# Patient Record
Sex: FEMALE | Race: WHITE | NOT HISPANIC OR LATINO | Employment: OTHER | URBAN - METROPOLITAN AREA
[De-identification: names, ages, dates, MRNs, and addresses within clinical notes are randomized per-mention and may not be internally consistent; named-entity substitution may affect disease eponyms.]

---

## 2023-12-06 ENCOUNTER — OFFICE VISIT (OUTPATIENT)
Dept: DERMATOLOGY | Facility: CLINIC | Age: 68
End: 2023-12-06
Payer: MEDICARE

## 2023-12-06 DIAGNOSIS — D22.9 MELANOCYTIC NEVUS, UNSPECIFIED LOCATION: Primary | ICD-10-CM

## 2023-12-06 DIAGNOSIS — L82.1 SEBORRHEIC KERATOSIS: ICD-10-CM

## 2023-12-06 DIAGNOSIS — L81.4 LENTIGO: ICD-10-CM

## 2023-12-06 DIAGNOSIS — L90.5 SCAR CONDITIONS AND FIBROSIS OF SKIN: ICD-10-CM

## 2023-12-06 DIAGNOSIS — D18.01 HEMANGIOMA OF SKIN: ICD-10-CM

## 2023-12-06 DIAGNOSIS — L57.0 ACTINIC KERATOSIS: ICD-10-CM

## 2023-12-06 DIAGNOSIS — Z85.828 PERSONAL HISTORY OF SKIN CANCER: ICD-10-CM

## 2023-12-06 DIAGNOSIS — L56.5 DSAP (DISSEMINATED SUPERFICIAL ACTINIC POROKERATOSIS): ICD-10-CM

## 2023-12-06 PROCEDURE — 17000 DESTRUCT PREMALG LESION: CPT | Performed by: DERMATOLOGY

## 2023-12-06 PROCEDURE — 1159F MED LIST DOCD IN RCRD: CPT | Performed by: DERMATOLOGY

## 2023-12-06 PROCEDURE — 99213 OFFICE O/P EST LOW 20 MIN: CPT | Performed by: DERMATOLOGY

## 2023-12-06 PROCEDURE — 17003 DESTRUCT PREMALG LES 2-14: CPT | Performed by: DERMATOLOGY

## 2023-12-06 PROCEDURE — 1036F TOBACCO NON-USER: CPT | Performed by: DERMATOLOGY

## 2023-12-06 RX ORDER — ROSUVASTATIN CALCIUM 5 MG/1
TABLET, COATED ORAL
COMMUNITY

## 2023-12-06 RX ORDER — LIFITEGRAST 50 MG/ML
SOLUTION/ DROPS OPHTHALMIC
COMMUNITY
Start: 2022-12-05

## 2023-12-06 RX ORDER — ACETAMINOPHEN 500 MG
TABLET ORAL
COMMUNITY
Start: 2022-11-01

## 2023-12-06 ASSESSMENT — ENCOUNTER SYMPTOMS
DEPRESSION: 0
LOSS OF SENSATION IN FEET: 0
OCCASIONAL FEELINGS OF UNSTEADINESS: 0

## 2023-12-06 NOTE — PROGRESS NOTES
Subjective   Terri Ley is a 68 y.o. female who presents for the following: Skin Check.    Skin Cancer Screening  She has a history of heavy sun exposure. She is in the sun frequently. She uses sunscreen daily. She reports no skin symptoms. Her moles are not changing.    Spots that concern her: none      History of Skin Cancer  History of Squamous Cell Carcinoma - 3/30/2023; excision performed by Dr. Sena Rhodes on 4/21/2023      Objective   Well appearing patient in no apparent distress; mood and affect are within normal limits.    A full examination was performed including scalp, head, eyes, ears, nose, lips, neck, chest, axillae, abdomen, back, buttocks, bilateral upper extremities, bilateral lower extremities, hands, feet, fingers, toes, fingernails, and toenails. All findings within normal limits unless otherwise noted below.    All nevi were symmetric brown macules without atypia on dermoscopy.     Scattered tan macules in sun-exposed areas.    Stuck on verrucous, tan-brown papules and plaques.      Scattered cherry-red papule(s).    Scar is well-healed without evidence of recurrence    RAYNA Legs, RAYNA Arms (10)  Destruction of Actinic Keratosis Procedure Note  Diagnosis: AK  Location:  RAYNA legs and RAYNA arms  Informed consent: Discussed risks (permanent scarring, light or dark discoloration, infection, pain, bleeding, bruising, redness, blister formation, and recurrence of the lesion) and benefits of the procedure, as well as the alternatives.  Informed consent was obtained.  Anesthesia: not required  Procedure Details:  The lesion was destroyed with liquid nitrogen. The patient tolerated procedure well.  Total number of lesions treated:  10  Plan:  The patient was instructed on post-op care. Recommend OTC analgesia as needed for pain.        Assessment/Plan   Melanocytic nevus, unspecified location    The ABCDEs of melanoma and warning signs of non-melanoma skin cancer were discussed with patient and  patient expressed understanding. Sun protection and use of at least SPF 30 discussed with patient. Pt instructed to reapply every 2 hours.     Lentigo    The ABCDEs of melanoma and warning signs of non-melanoma skin cancer were discussed with patient and patient expressed understanding. Sun protection and use of at least SPF 30 discussed with patient. Pt instructed to reapply every 2 hours.     Seborrheic keratosis    Hemangioma of skin    Scar conditions and fibrosis of skin    Actinic keratosis (10)  RAYNA Legs, RAYNA Arms    Destr of lesion - RAYNA Legs, RAYNA Arms  Complexity: simple    Destruction method: cryotherapy    Informed consent: discussed and consent obtained    Lesion destroyed using liquid nitrogen: Yes    Cryotherapy cycles:  1  Outcome: patient tolerated procedure well with no complications    Post-procedure details: wound care instructions given        Follow up in 6-12 months.    Scribe Attestation  By signing my name below, IMonisha Scribe   attest that this documentation has been prepared under the direction and in the presence of Dasia Casey MD.

## 2024-01-02 ENCOUNTER — OFFICE VISIT (OUTPATIENT)
Dept: ORTHOPEDIC SURGERY | Facility: CLINIC | Age: 69
End: 2024-01-02
Payer: MEDICARE

## 2024-01-02 ENCOUNTER — ANCILLARY PROCEDURE (OUTPATIENT)
Dept: RADIOLOGY | Facility: CLINIC | Age: 69
End: 2024-01-02
Payer: MEDICARE

## 2024-01-02 DIAGNOSIS — Z96.641 AFTERCARE FOLLOWING RIGHT HIP JOINT REPLACEMENT SURGERY: ICD-10-CM

## 2024-01-02 DIAGNOSIS — Z47.1 AFTERCARE FOLLOWING RIGHT HIP JOINT REPLACEMENT SURGERY: Primary | ICD-10-CM

## 2024-01-02 DIAGNOSIS — Z47.1 AFTERCARE FOLLOWING RIGHT HIP JOINT REPLACEMENT SURGERY: ICD-10-CM

## 2024-01-02 DIAGNOSIS — Z96.641 AFTERCARE FOLLOWING RIGHT HIP JOINT REPLACEMENT SURGERY: Primary | ICD-10-CM

## 2024-01-02 PROCEDURE — 99213 OFFICE O/P EST LOW 20 MIN: CPT | Performed by: ORTHOPAEDIC SURGERY

## 2024-01-02 PROCEDURE — 73502 X-RAY EXAM HIP UNI 2-3 VIEWS: CPT | Mod: RIGHT SIDE | Performed by: ORTHOPAEDIC SURGERY

## 2024-01-02 PROCEDURE — 73502 X-RAY EXAM HIP UNI 2-3 VIEWS: CPT | Mod: RT

## 2024-01-02 PROCEDURE — 1036F TOBACCO NON-USER: CPT | Performed by: ORTHOPAEDIC SURGERY

## 2024-01-02 PROCEDURE — 1159F MED LIST DOCD IN RCRD: CPT | Performed by: ORTHOPAEDIC SURGERY

## 2024-01-02 NOTE — PROGRESS NOTES
History of present illness    68-year-old female here for follow-up 1 year from her right total hip replacement she states the hip is doing very well she is back to playing tennis she has been doing some strength training and that seems to be helping she is having some soreness in her left knee she has a meniscal tear and some arthritis in her left knee she has been trying to treat conservatively but as far as her hip goes everything is feeling good      Past medical , Surgical, Family and social history reviewed.      Physical exam  General: No acute distress and breathing comfortably.  Patient is pleasant and cooperative with the examination.    Extremity  Good range of motion of the right hip no pain with internal ex rotation neurologically intact distally good range of motion brisk cap refill compartments soft calves nontender    Diagnostics      XR hip right with pelvis when performed 2 or 3 views    Result Date: 1/2/2024  Interpreted By:  Lakesha Kim, STUDY: XR HIP RIGHT WITH PELVIS WHEN PERFORMED 2 OR 3 VIEWS;  ;  1/2/2024 11:50 am   INDICATION: Signs/Symptoms:s/p THR.   ACCESSION NUMBER(S): [id number]   ORDERING CLINICIAN: LAKESHA KIM   FINDINGS: Two views show patient status post total hip replacement in good alignment.  No signs of fracture dislocation or other bony abnormality       Signed by: Lakesha Kim 1/2/2024 11:51 AM Dictation workstation:   [id number]       Procedure  [ none]    Assessment  Status post total hip replacement right    Treatment plan  1.  Continue with activities tolerated the hip gives her more trouble she let me know otherwise follow-up as needed.  2. [   ]  3. [   ]  4.  All of the patient's questions were answered.    This note was prepared using voice recognition software.  The details of this note are correct and have been reviewed, and corrected to the best of my ability.  Some grammatical areas may persist related to the Dragon  software    Emiliano Kim MD  Senior Attending Physician  [facility]  [facility] [place]    [phone]

## 2024-05-07 ENCOUNTER — HOSPITAL ENCOUNTER (OUTPATIENT)
Dept: RADIOLOGY | Facility: EXTERNAL LOCATION | Age: 69
Discharge: HOME | End: 2024-05-07
Payer: MEDICARE

## 2024-05-07 DIAGNOSIS — R05.9 COUGH, UNSPECIFIED TYPE: ICD-10-CM

## 2024-05-28 DIAGNOSIS — Z47.1 AFTERCARE FOLLOWING RIGHT HIP JOINT REPLACEMENT SURGERY: Primary | ICD-10-CM

## 2024-05-28 DIAGNOSIS — Z96.641 AFTERCARE FOLLOWING RIGHT HIP JOINT REPLACEMENT SURGERY: Primary | ICD-10-CM

## 2024-05-28 RX ORDER — AMOXICILLIN 500 MG/1
CAPSULE ORAL
Qty: 4 CAPSULE | Refills: 5 | Status: SHIPPED | OUTPATIENT
Start: 2024-05-28

## 2024-06-17 ENCOUNTER — OFFICE VISIT (OUTPATIENT)
Dept: DERMATOLOGY | Facility: CLINIC | Age: 69
End: 2024-06-17
Payer: MEDICARE

## 2024-06-17 DIAGNOSIS — L82.1 SEBORRHEIC KERATOSIS: Primary | ICD-10-CM

## 2024-06-17 DIAGNOSIS — L73.9 FOLLICULITIS: ICD-10-CM

## 2024-06-17 PROCEDURE — 1159F MED LIST DOCD IN RCRD: CPT | Performed by: DERMATOLOGY

## 2024-06-17 PROCEDURE — 99213 OFFICE O/P EST LOW 20 MIN: CPT | Performed by: DERMATOLOGY

## 2024-06-17 NOTE — PROGRESS NOTES
Subjective   Terri Ley is a 68 y.o. female who presents for the following: Suspicious Skin Lesion (Left upper arm).    Skin Lesion  She describes it as a growth, that is located on her  L shoulder .  It was first noticed 4 weeks ago. It has been causing no skin symptoms and is growing. Previously this spot has been frozen with liquid nitrogen.  Other spots that are concerning: spots on chest/back      Objective   Well appearing patient in no apparent distress; mood and affect are within normal limits.    A focused examination was performed including chest, back and L arm. All findings within normal limits unless otherwise noted below.    Left Shoulder - Anterior  Stuck on verrucous, tan-brown papules and plaques.      Chest - Medial (Center), Mid Back  Follicularly-based erythematous papules and pustules.      Assessment/Plan   Seborrheic keratosis  Left Shoulder - Anterior    The benign nature of the diagnosis was explained to patient. LN2 x 2 cycles performed to ISK on L shoulder per pt request. Wound care d/w pt and pt expressed understanding.       Folliculitis  Chest - Medial (Center); Mid Back    The nature of the diagnosis was explained to patient. Encouraged patient to treat with cortisone BID x 1 week as no evidence of infection, just itching. Pt agrees with plan as above.       Follow up for regularly scheduled FBSE.

## 2024-08-01 ENCOUNTER — OFFICE VISIT (OUTPATIENT)
Dept: DERMATOLOGY | Facility: CLINIC | Age: 69
End: 2024-08-01

## 2024-08-01 DIAGNOSIS — Z41.9 SURGERY, ELECTIVE: Primary | ICD-10-CM

## 2024-08-01 PROBLEM — C44.92 SQUAMOUS CELL SKIN CANCER: Status: ACTIVE | Noted: 2023-03-30

## 2024-08-01 PROCEDURE — PBTXA ADMINISTRATION FEE COSMETIC: Performed by: DERMATOLOGY

## 2024-08-01 PROCEDURE — 1036F TOBACCO NON-USER: CPT | Performed by: DERMATOLOGY

## 2024-08-01 PROCEDURE — 1159F MED LIST DOCD IN RCRD: CPT | Performed by: DERMATOLOGY

## 2024-08-01 PROCEDURE — BOTOX BOTOX 1 UNIT: Performed by: DERMATOLOGY

## 2024-08-01 NOTE — PROGRESS NOTES
Subjective     Terri Ley is a 68 y.o. female who presents for the following: Cosmetic (Patient is here today for botox. On her last visit of 01/30/2023, patient had 26 units of botox done. She did like the results. She would like to see what is recommended for units).         Review of Systems:  No other skin or systemic complaints other than what is documented elsewhere in the note.    The following portions of the chart were reviewed this encounter and updated as appropriate:           Specialty Problems          Dermatology Problems    Squamous cell skin cancer     In situ, left thigh, excised by Dr Rhodes 04/11/2023          Past Medical History:  Terri Ley  has a past medical history of Squamous cell skin cancer (03/30/2023).    Past Surgical History:  Terri Ley  has no past surgical history on file.    Family History:  Patient family history is not on file.       Objective   Well appearing patient in no apparent distress; mood and affect are within normal limits.    A focused skin examination was performed. All findings within normal limits unless otherwise noted below.    Assessment/Plan   1. Surgery, elective (4)  Glabella, Left Forehead, Mid Frontal Scalp, Right Forehead  Dynamic and static rhytids          Botox is a neurotoxin which prevents muscles from marbella that can help soften and smooth fine lines/wrinkles.  Risks and benefits were discussed bruising and swelling, lid droop, dropping of eyebrow, asymmetrical smile. For continued improvement ongoing treatment with Botox will soften lines  Botox typically begins to work 3 days after injection, full effect by 14 days. Typically lasts about 3 months. Continued treatments will continue to help lines to soften over time. However, lines may not completely disappear.  After treatment do not lie flat for 4 hours and do not exercise for 24 hours after the procedure.     Chemodenervation - Glabella, Left Forehead, Mid Frontal  Scalp, Right Forehead    Date/Time: 8/1/2024 10:16 AM    Performed by: Dasia Casey MD  Authorized by: Dasia Casey MD      Cosmetic:  yesnot medical botulinum toxin injection    Consent:      Consent obtained:  Written     Consent given by:  Patient     Risks discussed:  Poor cosmetic result     Alternatives discussed:  No treatment    Universal protocol:      Relevant documents present and verified:  Yes       Site/side verified:  Yes       Immediately prior to procedure a time out was called:  Yes       Patient identity confirmed:  Verbally with patient    Pre-procedure details:   Prep type:  Isopropyl alcohol    Procedure details:      Diluted by:  Preservative free saline     Toxin (Brand):  OnaBoNT-A (Botox)    Total units injected:  15    Post-procedure details:      Patient tolerance of procedure:  Tolerated well, no immediate complications    Related Medications  botulinum toxin Type A (Cosm) (Botox) injection 15 Units      Follow up 3-6 months per pt

## 2024-08-20 ENCOUNTER — APPOINTMENT (OUTPATIENT)
Dept: PRIMARY CARE | Facility: CLINIC | Age: 69
End: 2024-08-20
Payer: MEDICARE

## 2024-09-23 ENCOUNTER — APPOINTMENT (OUTPATIENT)
Dept: DERMATOLOGY | Facility: CLINIC | Age: 69
End: 2024-09-23
Payer: MEDICARE

## 2024-09-23 DIAGNOSIS — D48.5 NEOPLASM OF UNCERTAIN BEHAVIOR OF SKIN: ICD-10-CM

## 2024-09-23 PROCEDURE — 1159F MED LIST DOCD IN RCRD: CPT | Performed by: DERMATOLOGY

## 2024-09-23 PROCEDURE — 1036F TOBACCO NON-USER: CPT | Performed by: DERMATOLOGY

## 2024-09-23 PROCEDURE — 99213 OFFICE O/P EST LOW 20 MIN: CPT | Performed by: DERMATOLOGY

## 2024-09-23 PROCEDURE — 11102 TANGNTL BX SKIN SINGLE LES: CPT | Performed by: DERMATOLOGY

## 2024-09-23 ASSESSMENT — DERMATOLOGY QUALITY OF LIFE (QOL) ASSESSMENT
ARE THERE EXCLUSIONS OR EXCEPTIONS FOR THE QUALITY OF LIFE ASSESSMENT: NO
RATE HOW BOTHERED YOU ARE BY SYMPTOMS OF YOUR SKIN PROBLEM (EG, ITCHING, STINGING BURNING, HURTING OR SKIN IRRITATION): 0 - NEVER BOTHERED
RATE HOW BOTHERED YOU ARE BY EFFECTS OF YOUR SKIN PROBLEMS ON YOUR ACTIVITIES (EG, GOING OUT, ACCOMPLISHING WHAT YOU WANT, WORK ACTIVITIES OR YOUR RELATIONSHIPS WITH OTHERS): 0 - NEVER BOTHERED
WHAT SINGLE SKIN CONDITION LISTED BELOW IS THE PATIENT ANSWERING THE QUALITY-OF-LIFE ASSESSMENT QUESTIONS ABOUT: NONE OF THE ABOVE
DATE THE QUALITY-OF-LIFE ASSESSMENT WAS COMPLETED: 67106
RATE HOW EMOTIONALLY BOTHERED YOU ARE BY YOUR SKIN PROBLEM (FOR EXAMPLE, WORRY, EMBARRASSMENT, FRUSTRATION): 0 - NEVER BOTHERED

## 2024-09-23 ASSESSMENT — DERMATOLOGY PATIENT ASSESSMENT
WHERE ARE THESE NEW OR CHANGING LESIONS LOCATED: RIGHT CHEEK
HAVE YOU HAD OR DO YOU HAVE VASCULAR DISEASE: NO
HAVE YOU HAD OR DO YOU HAVE A STAPH INFECTION: NO
DO YOU USE A TANNING BED: NO
DO YOU HAVE ANY NEW OR CHANGING LESIONS: YES
ARE YOU AN ORGAN TRANSPLANT RECIPIENT: NO
DO YOU USE SUNSCREEN: OCCASIONALLY

## 2024-09-23 ASSESSMENT — ITCH NUMERIC RATING SCALE: HOW SEVERE IS YOUR ITCHING?: 0

## 2024-09-23 ASSESSMENT — PATIENT GLOBAL ASSESSMENT (PGA): PATIENT GLOBAL ASSESSMENT: PATIENT GLOBAL ASSESSMENT:  1 - CLEAR

## 2024-09-23 NOTE — PROGRESS NOTES
Subjective     Terri Ley is a 69 y.o. female who presents for the following: Suspicious Skin Lesion.     Skin Lesion  She describes it as a spot, that is located on her  right cheek .  It was first noticed several months ago. It has been causing no skin symptoms and is growing. Previously this spot has not been treated before.      Review of Systems:  No other skin or systemic complaints other than what is documented elsewhere in the note.    The following portions of the chart were reviewed this encounter and updated as appropriate:       Skin Cancer History  No skin cancer on file.    Specialty Problems          Dermatology Problems    Squamous cell skin cancer     In situ, left thigh, excised by Dr Rhodes 04/11/2023          Past Medical History:  Terri Ley  has a past medical history of Squamous cell skin cancer (03/30/2023).    Past Surgical History:  Terri Ley  has no past surgical history on file.    Family History:  Patient family history is not on file.       Objective   Well appearing patient in no apparent distress; mood and affect are within normal limits.    A focused skin examination was performed. All findings within normal limits unless otherwise noted below.    Assessment/Plan   1. Neoplasm of uncertain behavior of skin  Right Malar Cheek  Erythematous scaly papule.    Lesion biopsy  Type of biopsy: tangential    Informed consent: discussed and consent obtained    Timeout: patient name, date of birth, surgical site, and procedure verified    Procedure prep:  Patient was prepped and draped  Anesthesia: the lesion was anesthetized in a standard fashion    Anesthetic:  1% lidocaine w/ epinephrine 1-100,000 local infiltration  Instrument used: DermaBlade    Hemostasis achieved with: aluminum chloride    Outcome: patient tolerated procedure well    Post-procedure details: sterile dressing applied and wound care instructions given    Dressing type: petrolatum and bandage         Follow up pending biopsy results.

## 2024-09-25 LAB
LABORATORY COMMENT REPORT: NORMAL
PATH REPORT.FINAL DX SPEC: NORMAL
PATH REPORT.GROSS SPEC: NORMAL
PATH REPORT.MICROSCOPIC SPEC OTHER STN: NORMAL
PATH REPORT.RELEVANT HX SPEC: NORMAL
PATH REPORT.TOTAL CANCER: NORMAL

## 2024-09-26 DIAGNOSIS — C44.320 SQUAMOUS CELL CARCINOMA OF SKIN OF FACE: ICD-10-CM

## 2024-09-26 DIAGNOSIS — C44.319 BASAL CELL CARCINOMA (BCC) OF RIGHT CHEEK: Primary | ICD-10-CM

## 2024-09-26 NOTE — RESULT ENCOUNTER NOTE
Although patient had already seen her results, patient was called and informed of shave biopsy results of the right malar cheek:  BCC.  Per Dr. Casey, Mohs is required.  PA will be started today.

## 2024-10-28 ENCOUNTER — APPOINTMENT (OUTPATIENT)
Dept: CARDIOLOGY | Facility: CLINIC | Age: 69
End: 2024-10-28
Payer: MEDICARE

## 2024-10-28 VITALS
SYSTOLIC BLOOD PRESSURE: 126 MMHG | WEIGHT: 136 LBS | DIASTOLIC BLOOD PRESSURE: 72 MMHG | HEART RATE: 121 BPM | BODY MASS INDEX: 25.03 KG/M2 | HEIGHT: 62 IN

## 2024-10-28 DIAGNOSIS — Z01.810 PREOP CARDIOVASCULAR EXAM: ICD-10-CM

## 2024-10-28 DIAGNOSIS — Z71.89 ENCOUNTER FOR MEDICATION REVIEW AND COUNSELING: ICD-10-CM

## 2024-10-28 DIAGNOSIS — Z71.89 ENCOUNTER TO DISCUSS TREATMENT OPTIONS: ICD-10-CM

## 2024-10-28 DIAGNOSIS — Z76.89 ENCOUNTER TO ESTABLISH CARE WITH NEW DOCTOR: ICD-10-CM

## 2024-10-28 DIAGNOSIS — I48.3 TYPICAL ATRIAL FLUTTER (MULTI): ICD-10-CM

## 2024-10-28 DIAGNOSIS — I48.91 ATRIAL FIBRILLATION, UNSPECIFIED TYPE (MULTI): Primary | ICD-10-CM

## 2024-10-28 DIAGNOSIS — I48.92 ATRIAL FLUTTER, UNSPECIFIED TYPE (MULTI): ICD-10-CM

## 2024-10-28 DIAGNOSIS — Z78.9 NEVER SMOKED CIGARETTES: ICD-10-CM

## 2024-10-28 PROBLEM — I48.20 CHRONIC ATRIAL FIBRILLATION (MULTI): Status: RESOLVED | Noted: 2024-10-28 | Resolved: 2024-10-28

## 2024-10-28 PROBLEM — I48.20 CHRONIC ATRIAL FIBRILLATION (MULTI): Status: ACTIVE | Noted: 2024-10-28

## 2024-10-28 PROBLEM — I48.0 PAROXYSMAL ATRIAL FIBRILLATION (MULTI): Status: ACTIVE | Noted: 2024-10-28

## 2024-10-28 PROCEDURE — 93000 ELECTROCARDIOGRAM COMPLETE: CPT | Performed by: INTERNAL MEDICINE

## 2024-10-28 PROCEDURE — 3008F BODY MASS INDEX DOCD: CPT | Performed by: INTERNAL MEDICINE

## 2024-10-28 PROCEDURE — 1036F TOBACCO NON-USER: CPT | Performed by: INTERNAL MEDICINE

## 2024-10-28 PROCEDURE — 99205 OFFICE O/P NEW HI 60 MIN: CPT | Performed by: INTERNAL MEDICINE

## 2024-10-28 PROCEDURE — 1159F MED LIST DOCD IN RCRD: CPT | Performed by: INTERNAL MEDICINE

## 2024-10-28 RX ORDER — METOPROLOL TARTRATE 25 MG/1
TABLET, FILM COATED ORAL
Qty: 30 TABLET | Refills: 2 | Status: SHIPPED | OUTPATIENT
Start: 2024-10-28

## 2024-10-28 RX ORDER — ENOXAPARIN SODIUM 100 MG/ML
60 INJECTION SUBCUTANEOUS EVERY 12 HOURS
Qty: 3 EACH | Refills: 1 | Status: SHIPPED | OUTPATIENT
Start: 2024-10-28

## 2024-10-28 RX ORDER — METOPROLOL SUCCINATE 50 MG/1
50 TABLET, EXTENDED RELEASE ORAL
COMMUNITY
Start: 2024-10-14

## 2024-10-28 RX ORDER — MUPIROCIN 20 MG/G
1 OINTMENT TOPICAL ONCE
OUTPATIENT
Start: 2024-10-28 | End: 2024-10-28

## 2024-10-28 RX ORDER — CHLORHEXIDINE GLUCONATE 40 MG/ML
SOLUTION TOPICAL ONCE
OUTPATIENT
Start: 2024-10-28 | End: 2024-10-28

## 2024-10-28 ASSESSMENT — ENCOUNTER SYMPTOMS
PALPITATIONS: 0
DYSPNEA ON EXERTION: 0

## 2024-10-29 ENCOUNTER — TELEPHONE (OUTPATIENT)
Dept: CARDIOLOGY | Facility: CLINIC | Age: 69
End: 2024-10-29
Payer: MEDICARE

## 2024-11-05 ENCOUNTER — TELEPHONE (OUTPATIENT)
Dept: NEPHROLOGY | Facility: CLINIC | Age: 69
End: 2024-11-05

## 2024-11-05 ENCOUNTER — APPOINTMENT (OUTPATIENT)
Dept: DERMATOLOGY | Facility: CLINIC | Age: 69
End: 2024-11-05
Payer: MEDICARE

## 2024-11-05 NOTE — PROGRESS NOTES
Pt left vm wanting to clarify if she is allowed to do strength training and lift 10lb weights until she has her ablation.

## 2024-11-06 NOTE — PROGRESS NOTES
Returned call to patient. Per Dr. Perez, patient is OK to lift 10lb weights up until ablation. Patient verbalized understanding.

## 2024-11-19 DIAGNOSIS — I48.91 ATRIAL FIBRILLATION, UNSPECIFIED TYPE (MULTI): Primary | ICD-10-CM

## 2024-11-19 DIAGNOSIS — I48.92 ATRIAL FLUTTER, UNSPECIFIED TYPE (MULTI): ICD-10-CM

## 2024-11-20 ENCOUNTER — APPOINTMENT (OUTPATIENT)
Dept: DERMATOLOGY | Facility: CLINIC | Age: 69
End: 2024-11-20
Payer: MEDICARE

## 2024-11-22 RX ORDER — METOPROLOL TARTRATE 25 MG/1
TABLET, FILM COATED ORAL
Qty: 30 TABLET | Refills: 1 | Status: SHIPPED | OUTPATIENT
Start: 2024-11-22

## 2024-12-03 ENCOUNTER — APPOINTMENT (OUTPATIENT)
Dept: DERMATOLOGY | Facility: CLINIC | Age: 69
End: 2024-12-03
Payer: MEDICARE

## 2024-12-03 VITALS — DIASTOLIC BLOOD PRESSURE: 81 MMHG | SYSTOLIC BLOOD PRESSURE: 142 MMHG | HEART RATE: 94 BPM

## 2024-12-03 DIAGNOSIS — C44.310 BASAL CELL CARCINOMA (BCC) OF SKIN OF FACE, UNSPECIFIED PART OF FACE: ICD-10-CM

## 2024-12-03 NOTE — PROGRESS NOTES
Office Visit Note  Date: 12/3/2024  Surgeon:  Sean Rhodes MD PhD  Office Location: [address] [address]  Dept: [phone]  Dept Fax: [phone]  Referring Provider: Dasia Casey MD  [address] [place] [address]  [address], [address],  [facility]    Subjective   Terri Ley is a 69 y.o. female who presents for the following: MOHS Surgery (Right Malar Cheek)    According to the patient, the lesion has been present for approximately greater than 1 year at the time of diagnosis.  The lesion is not causing symptoms.  The lesion has not been treated previously.    The patient does not have a pacemaker / defibrillator.  The patient does not have a heart valve / joint replacement.    The patient is on blood thinners.  The patient does not have a history of hepatitis B or C.  The patient does not have a history of HIV.  The patient does not have a history of immunosuppression (e.g. organ transplantation, malignancy, medications)    Review of Systems:  No other skin or systemic complaints other than what is documented elsewhere in the note.    MEDICAL HISTORY: clinically relevant history including significant past medical history, medications and allergies was reviewed and documented in Epic.    Objective   Well appearing patient in no apparent distress; mood and affect are within normal limits.  Vital signs: See record.  Noted on the Right Malar Cheek  Is a 0.2 x 0.2 cm scar              The patient confirmed the identified site.    Discussion:  The nature of the diagnosis was explained. The lesion is a skin cancer.  It has a risk of local growth and distant spread. The condition is associated with sun exposure.  Warning signs of non-melanoma skin cancer discussed. Patient was instructed to perform monthly self skin examination.  We recommended that the patient have regular full skin exams given an increased risk of subsequent skin cancers. The  patient was instructed to use sun protective behaviors including use of broad spectrum sunscreens and sun protective clothing to reduce risk of skin cancers.      Risks, benefits, side effects of Mohs surgery were discussed with patient and the patient voiced understanding.  It was explained that even though the cure rate of Mohs is very high it is not 100%. Risks of surgery including but not limited to bleeding, infection, numbness, nerve damage, and scar were reviewed.  Discussion included wound care requirements, activity restrictions, likely scar outcome and time to heal.     After Mohs surgery, the defect may need to be repaired surgically and the scar may be longer than the original lesion.  Reconstruction options, risks, and benefits were reviewed including second intention healing, linear repair (4-1 ratio was explained), local flaps, skin grafts, cartilage grafts and interpolation flaps (the need for multiple surgeries was explained). Possible outcomes were reviewed including likely scar appearance, failure of flap survival, infection, bleeding and the need for revision surgery.     The pathology was reviewed, the photograph was reviewed, and the referring physician's note was reviewed.    Patient elected for Mohs surgery.

## 2024-12-03 NOTE — PROGRESS NOTES
Mohs Surgery Operative Note    Date of Surgery:  12/3/2024  Surgeon:  Sean Rhodes MD PhD  Office Location: [address] [address]  Dept: [phone]  Dept Fax: [phone]  Referring Provider: Dasia Casey MD  [address] [place] [address]  [address], [address] [address]      Assessment/Plan   Pre-procedure:   Obtained informed consent: written from patient  The surgical site was identified and confirmed with the patient.     Intra-operative:   Audible time out called at : 1:50 PM 12/03/24  by: Brandon Schuler MA   Verified patient name, birthdate, site, specimen bottle label & requisition.    The planned procedure(s) was again reviewed with the patient. The risks of bleeding, infection, nerve damage and scarring were reviewed. Written authorization was obtained. The patient identity, surgical site, and planned procedure(s) were verified. The provider acted as both surgeon and pathologist.     Basal cell carcinoma (BCC) of skin of face, unspecified part of face  Right Malar Cheek    Mohs surgery    Consent obtained: written    Universal Protocol:  Procedure explained and questions answered to patient or proxy's satisfaction: Yes    Test results available and properly labeled: Yes    Pathology report reviewed: Yes    External notes reviewed: Yes    Photo or diagram used for site identification: Yes    Site/side marked: Yes    Slide independently reviewed by Mohs surgeon: Yes    Immediately prior to procedure a time out was called: Yes    Patient identity confirmed: verbally with patient  Preparation: Patient was prepped and draped in usual sterile fashion      Anticoagulation:  Is the patient taking prescription anticoagulant and/or aspirin prescribed/recommended by a physician? Yes    Was the anticoagulation regimen changed prior to Mohs? No      Anesthesia:  Anesthesia method: local infiltration  Local anesthetic: lidocaine 1% WITH epi    Procedure  Details:  Case ID Number: [id number]  Biopsy accession number: [id number]  Date of biopsy: 9/23/2024  Frozen section biopsy performed: No    Specimen debulked: No    Pre-Op diagnosis: basal cell carcinoma  Surgical site (from skin exam): Right Malar Cheek  Pre-operative length (cm): 0.2  Pre-operative width (cm): 0.2  Indications for Mohs surgery: anatomic location where tissue conservation is critical    Micrographic Surgery Details:  Post-operative length (cm): 0.4  Post-operative width (cm): 0.5  Number of Mohs stages: 1    Stage 1     Comments: The patient was brought into the operating room and placed in the procedure chair in the appropriate position.  The area positive by previous biopsy was identified and confirmed with the patient. The area of clinically obvious tumor was debulked using a curette and/or scalpel as needed. An incision was made following the Mohs approach through the skin. The specimen was taken to the lab, divided into 2 piece(s) and appropriately chromacoded and processed.                 Tumor features identified on Mohs section: no tumor identified     Tumor features identified on Mohs section comment: Clear in first cuts; nodular basal cell carcinoma appears in deeper sections    Depth of defect: subcutaneous fat    Patient tolerance of procedure: tolerated well, no immediate complications    Reconstruction:  Was the defect reconstructed? Yes    Was reconstruction performed by the same Mohs surgeon? Yes    Setting of reconstruction: outpatient office  When was reconstruction performed? same day  Type of reconstruction: linear  Linear reconstruction: intermediate  Length of linear repair (cm): 1.3  Subcutaneous Layers (Deep Stitches)   Suture size:  5-0  Suture type:  Vicryl  Stitches:  Buried vertical mattress  Fine/surface layer approximation (top stitches)   Epidermal/Superficial suture size:  5-0  Epidermal/Superficial suture type:  Fast-absorbing gut  Stitches: simple running     Hemostasis achieved with: electrodesiccation  Outcome: patient tolerated procedure well with no complications    Post-procedure details: sterile dressing applied and wound care instructions given    Dressing type: pressure dressing, petrolatum, Telfa pad and Hypafix          Intermediate Linear Repair:  Given the location and size of the defect, it was determined that an intermediate layered linear closure was required to restore normal anatomy and function. The repair is an intermediate closure as two layers of sutures were required. The defect was undermined extensively at the level of the subcutaneous plane. Standing cutaneous cones were removed using Burow's triangles. The wound edges were brought into close approximation with buried vertical mattress sutures. The remainder of the wound was then closed with epidermal top sutures.              The final repair measured 1.3 cm    Wound care was discussed, and the patient was given written post-operative wound care instructions.      The patient will follow up with Sean Rhodes MD PhD as needed for any post operative problems or concerns, and will follow up with their primary dermatologist as scheduled.

## 2024-12-11 ENCOUNTER — PATIENT MESSAGE (OUTPATIENT)
Dept: CARDIOLOGY | Facility: CLINIC | Age: 69
End: 2024-12-11

## 2024-12-11 ENCOUNTER — APPOINTMENT (OUTPATIENT)
Dept: DERMATOLOGY | Facility: CLINIC | Age: 69
End: 2024-12-11
Payer: MEDICARE

## 2024-12-12 ENCOUNTER — LAB (OUTPATIENT)
Dept: LAB | Facility: LAB | Age: 69
End: 2024-12-12
Payer: MEDICARE

## 2024-12-12 DIAGNOSIS — I48.92 ATRIAL FLUTTER, UNSPECIFIED TYPE (MULTI): ICD-10-CM

## 2024-12-12 LAB
ANION GAP SERPL CALC-SCNC: 14 MMOL/L (ref 10–20)
BUN SERPL-MCNC: 16 MG/DL (ref 6–23)
CALCIUM SERPL-MCNC: 9.3 MG/DL (ref 8.6–10.3)
CHLORIDE SERPL-SCNC: 106 MMOL/L (ref 98–107)
CO2 SERPL-SCNC: 25 MMOL/L (ref 21–32)
CREAT SERPL-MCNC: 0.67 MG/DL (ref 0.5–1.05)
EGFRCR SERPLBLD CKD-EPI 2021: >90 ML/MIN/1.73M*2
ERYTHROCYTE [DISTWIDTH] IN BLOOD BY AUTOMATED COUNT: 13 % (ref 11.5–14.5)
GLUCOSE SERPL-MCNC: 97 MG/DL (ref 74–99)
HCT VFR BLD AUTO: 48.6 % (ref 36–46)
HGB BLD-MCNC: 15.2 G/DL (ref 12–16)
INR PPP: 1.3 (ref 0.9–1.1)
MCH RBC QN AUTO: 29.3 PG (ref 26–34)
MCHC RBC AUTO-ENTMCNC: 31.3 G/DL (ref 32–36)
MCV RBC AUTO: 94 FL (ref 80–100)
NRBC BLD-RTO: 0 /100 WBCS (ref 0–0)
PLATELET # BLD AUTO: 195 X10*3/UL (ref 150–450)
POTASSIUM SERPL-SCNC: 4.3 MMOL/L (ref 3.5–5.3)
PROTHROMBIN TIME: 15.1 SECONDS (ref 9.8–12.8)
RBC # BLD AUTO: 5.19 X10*6/UL (ref 4–5.2)
SODIUM SERPL-SCNC: 141 MMOL/L (ref 136–145)
WBC # BLD AUTO: 6 X10*3/UL (ref 4.4–11.3)

## 2024-12-12 PROCEDURE — 85027 COMPLETE CBC AUTOMATED: CPT

## 2024-12-12 PROCEDURE — 85610 PROTHROMBIN TIME: CPT

## 2024-12-12 PROCEDURE — 36415 COLL VENOUS BLD VENIPUNCTURE: CPT

## 2024-12-12 PROCEDURE — 80048 BASIC METABOLIC PNL TOTAL CA: CPT

## 2024-12-16 RX ORDER — CHLORHEXIDINE GLUCONATE 40 MG/ML
SOLUTION TOPICAL ONCE
Status: CANCELLED | OUTPATIENT
Start: 2024-12-16 | End: 2024-12-16

## 2024-12-18 ENCOUNTER — HOSPITAL ENCOUNTER (OUTPATIENT)
Facility: HOSPITAL | Age: 69
Setting detail: OUTPATIENT SURGERY
Discharge: HOME | End: 2024-12-18
Attending: INTERNAL MEDICINE | Admitting: INTERNAL MEDICINE
Payer: MEDICARE

## 2024-12-18 ENCOUNTER — HOSPITAL ENCOUNTER (OUTPATIENT)
Dept: CARDIOLOGY | Facility: HOSPITAL | Age: 69
Discharge: HOME | End: 2024-12-18
Payer: MEDICARE

## 2024-12-18 ENCOUNTER — APPOINTMENT (OUTPATIENT)
Dept: CARDIOLOGY | Facility: HOSPITAL | Age: 69
End: 2024-12-18
Payer: MEDICARE

## 2024-12-18 VITALS
HEIGHT: 64 IN | RESPIRATION RATE: 16 BRPM | SYSTOLIC BLOOD PRESSURE: 141 MMHG | DIASTOLIC BLOOD PRESSURE: 84 MMHG | HEART RATE: 88 BPM | TEMPERATURE: 97.2 F | OXYGEN SATURATION: 98 % | BODY MASS INDEX: 23.37 KG/M2 | WEIGHT: 136.91 LBS

## 2024-12-18 VITALS
HEART RATE: 99 BPM | OXYGEN SATURATION: 98 % | DIASTOLIC BLOOD PRESSURE: 76 MMHG | SYSTOLIC BLOOD PRESSURE: 154 MMHG | RESPIRATION RATE: 14 BRPM

## 2024-12-18 DIAGNOSIS — I48.92 ATRIAL FLUTTER, UNSPECIFIED TYPE (MULTI): Primary | ICD-10-CM

## 2024-12-18 DIAGNOSIS — I48.91 ATRIAL FIBRILLATION, UNSPECIFIED TYPE (MULTI): ICD-10-CM

## 2024-12-18 DIAGNOSIS — I48.3 TYPICAL ATRIAL FLUTTER (MULTI): ICD-10-CM

## 2024-12-18 LAB
ANION GAP SERPL CALC-SCNC: 12 MMOL/L (ref 10–20)
APTT PPP: 34 SECONDS (ref 27–38)
ATRIAL RATE: 264 BPM
BUN SERPL-MCNC: 13 MG/DL (ref 6–23)
CALCIUM SERPL-MCNC: 9.1 MG/DL (ref 8.6–10.3)
CHLORIDE SERPL-SCNC: 108 MMOL/L (ref 98–107)
CO2 SERPL-SCNC: 24 MMOL/L (ref 21–32)
CREAT SERPL-MCNC: 0.63 MG/DL (ref 0.5–1.05)
EGFRCR SERPLBLD CKD-EPI 2021: >90 ML/MIN/1.73M*2
EJECTION FRACTION: 53 %
GLUCOSE SERPL-MCNC: 101 MG/DL (ref 74–99)
INR PPP: 1.1 (ref 0.9–1.1)
P AXIS: 270 DEGREES
POTASSIUM SERPL-SCNC: 4.3 MMOL/L (ref 3.5–5.3)
PROTHROMBIN TIME: 12.7 SECONDS (ref 9.8–12.8)
Q ONSET: 221 MS
QRS COUNT: 12 BEATS
QRS DURATION: 100 MS
QT INTERVAL: 412 MS
QTC CALCULATION(BAZETT): 447 MS
QTC FREDERICIA: 436 MS
R AXIS: 64 DEGREES
SODIUM SERPL-SCNC: 140 MMOL/L (ref 136–145)
T AXIS: 87 DEGREES
T OFFSET: 427 MS
VENTRICULAR RATE: 71 BPM

## 2024-12-18 PROCEDURE — 76937 US GUIDE VASCULAR ACCESS: CPT | Performed by: INTERNAL MEDICINE

## 2024-12-18 PROCEDURE — 2720000007 HC OR 272 NO HCPCS: Performed by: INTERNAL MEDICINE

## 2024-12-18 PROCEDURE — 7100000001 HC RECOVERY ROOM TIME - INITIAL BASE CHARGE

## 2024-12-18 PROCEDURE — 99153 MOD SED SAME PHYS/QHP EA: CPT | Performed by: INTERNAL MEDICINE

## 2024-12-18 PROCEDURE — C1732 CATH, EP, DIAG/ABL, 3D/VECT: HCPCS | Performed by: INTERNAL MEDICINE

## 2024-12-18 PROCEDURE — 93623 PRGRMD STIMJ&PACG IV RX NFS: CPT | Performed by: INTERNAL MEDICINE

## 2024-12-18 PROCEDURE — 92960 CARDIOVERSION ELECTRIC EXT: CPT | Mod: 59 | Performed by: INTERNAL MEDICINE

## 2024-12-18 PROCEDURE — 2500000004 HC RX 250 GENERAL PHARMACY W/ HCPCS (ALT 636 FOR OP/ED): Performed by: INTERNAL MEDICINE

## 2024-12-18 PROCEDURE — 93005 ELECTROCARDIOGRAM TRACING: CPT

## 2024-12-18 PROCEDURE — 93653 COMPRE EP EVAL TX SVT: CPT | Mod: 22 | Performed by: INTERNAL MEDICINE

## 2024-12-18 PROCEDURE — 7100000002 HC RECOVERY ROOM TIME - EACH INCREMENTAL 1 MINUTE

## 2024-12-18 PROCEDURE — 2780000003 HC OR 278 NO HCPCS: Performed by: INTERNAL MEDICINE

## 2024-12-18 PROCEDURE — G0269 OCCLUSIVE DEVICE IN VEIN ART: HCPCS | Mod: MUE | Performed by: INTERNAL MEDICINE

## 2024-12-18 PROCEDURE — 99152 MOD SED SAME PHYS/QHP 5/>YRS: CPT | Performed by: INTERNAL MEDICINE

## 2024-12-18 PROCEDURE — 99223 1ST HOSP IP/OBS HIGH 75: CPT | Performed by: NURSE PRACTITIONER

## 2024-12-18 PROCEDURE — 2500000005 HC RX 250 GENERAL PHARMACY W/O HCPCS: Performed by: NURSE PRACTITIONER

## 2024-12-18 PROCEDURE — 7100000009 HC PHASE TWO TIME - INITIAL BASE CHARGE

## 2024-12-18 PROCEDURE — C1730 CATH, EP, 19 OR FEW ELECT: HCPCS | Performed by: INTERNAL MEDICINE

## 2024-12-18 PROCEDURE — 2500000005 HC RX 250 GENERAL PHARMACY W/O HCPCS: Performed by: INTERNAL MEDICINE

## 2024-12-18 PROCEDURE — 7100000010 HC PHASE TWO TIME - EACH INCREMENTAL 1 MINUTE

## 2024-12-18 PROCEDURE — 7100000009 HC PHASE TWO TIME - INITIAL BASE CHARGE: Performed by: INTERNAL MEDICINE

## 2024-12-18 PROCEDURE — 80048 BASIC METABOLIC PNL TOTAL CA: CPT | Performed by: NURSE PRACTITIONER

## 2024-12-18 PROCEDURE — 7100000002 HC RECOVERY ROOM TIME - EACH INCREMENTAL 1 MINUTE: Performed by: INTERNAL MEDICINE

## 2024-12-18 PROCEDURE — 93653 COMPRE EP EVAL TX SVT: CPT | Performed by: INTERNAL MEDICINE

## 2024-12-18 PROCEDURE — C1760 CLOSURE DEV, VASC: HCPCS | Performed by: INTERNAL MEDICINE

## 2024-12-18 PROCEDURE — 2500000001 HC RX 250 WO HCPCS SELF ADMINISTERED DRUGS (ALT 637 FOR MEDICARE OP): Performed by: NURSE PRACTITIONER

## 2024-12-18 PROCEDURE — 36415 COLL VENOUS BLD VENIPUNCTURE: CPT | Performed by: NURSE PRACTITIONER

## 2024-12-18 PROCEDURE — 7100000010 HC PHASE TWO TIME - EACH INCREMENTAL 1 MINUTE: Performed by: INTERNAL MEDICINE

## 2024-12-18 PROCEDURE — 7100000001 HC RECOVERY ROOM TIME - INITIAL BASE CHARGE: Performed by: INTERNAL MEDICINE

## 2024-12-18 PROCEDURE — C1733 CATH, EP, OTHR THAN COOL-TIP: HCPCS | Performed by: INTERNAL MEDICINE

## 2024-12-18 PROCEDURE — 85730 THROMBOPLASTIN TIME PARTIAL: CPT | Performed by: NURSE PRACTITIONER

## 2024-12-18 PROCEDURE — 2500000004 HC RX 250 GENERAL PHARMACY W/ HCPCS (ALT 636 FOR OP/ED)

## 2024-12-18 PROCEDURE — 93005 ELECTROCARDIOGRAM TRACING: CPT | Mod: 59

## 2024-12-18 RX ORDER — CHLORHEXIDINE GLUCONATE 40 MG/ML
SOLUTION TOPICAL ONCE
Status: COMPLETED | OUTPATIENT
Start: 2024-12-18 | End: 2024-12-18

## 2024-12-18 RX ORDER — ASPIRIN 325 MG
325 TABLET ORAL DAILY
Status: DISCONTINUED | OUTPATIENT
Start: 2024-12-18 | End: 2024-12-18 | Stop reason: HOSPADM

## 2024-12-18 RX ORDER — ONDANSETRON 4 MG/1
4 TABLET, FILM COATED ORAL EVERY 8 HOURS PRN
Status: DISCONTINUED | OUTPATIENT
Start: 2024-12-18 | End: 2024-12-18 | Stop reason: HOSPADM

## 2024-12-18 RX ORDER — MIDAZOLAM HYDROCHLORIDE 1 MG/ML
INJECTION INTRAMUSCULAR; INTRAVENOUS AS NEEDED
Status: DISCONTINUED | OUTPATIENT
Start: 2024-12-18 | End: 2024-12-18 | Stop reason: HOSPADM

## 2024-12-18 RX ORDER — FENTANYL CITRATE 50 UG/ML
INJECTION, SOLUTION INTRAMUSCULAR; INTRAVENOUS AS NEEDED
Status: DISCONTINUED | OUTPATIENT
Start: 2024-12-18 | End: 2024-12-18 | Stop reason: HOSPADM

## 2024-12-18 RX ORDER — LIDOCAINE HYDROCHLORIDE 10 MG/ML
INJECTION, SOLUTION EPIDURAL; INFILTRATION; INTRACAUDAL; PERINEURAL AS NEEDED
Status: DISCONTINUED | OUTPATIENT
Start: 2024-12-18 | End: 2024-12-18 | Stop reason: HOSPADM

## 2024-12-18 RX ORDER — PROPOFOL 10 MG/ML
INJECTION, EMULSION INTRAVENOUS AS NEEDED
Status: DISCONTINUED | OUTPATIENT
Start: 2024-12-18 | End: 2024-12-18 | Stop reason: HOSPADM

## 2024-12-18 RX ORDER — ACETAMINOPHEN 325 MG/1
650 TABLET ORAL EVERY 4 HOURS PRN
Status: DISCONTINUED | OUTPATIENT
Start: 2024-12-18 | End: 2024-12-18 | Stop reason: HOSPADM

## 2024-12-18 RX ORDER — MIDAZOLAM HYDROCHLORIDE 1 MG/ML
INJECTION, SOLUTION INTRAMUSCULAR; INTRAVENOUS AS NEEDED
Status: DISCONTINUED | OUTPATIENT
Start: 2024-12-18 | End: 2024-12-18 | Stop reason: HOSPADM

## 2024-12-18 RX ORDER — ONDANSETRON HYDROCHLORIDE 2 MG/ML
INJECTION, SOLUTION INTRAVENOUS
Status: COMPLETED
Start: 2024-12-18 | End: 2024-12-18

## 2024-12-18 RX ORDER — ONDANSETRON HYDROCHLORIDE 2 MG/ML
4 INJECTION, SOLUTION INTRAVENOUS EVERY 8 HOURS PRN
Status: DISCONTINUED | OUTPATIENT
Start: 2024-12-18 | End: 2024-12-18 | Stop reason: HOSPADM

## 2024-12-18 RX ORDER — METOCLOPRAMIDE HYDROCHLORIDE 5 MG/ML
10 INJECTION INTRAMUSCULAR; INTRAVENOUS ONCE
Status: COMPLETED | OUTPATIENT
Start: 2024-12-18 | End: 2024-12-18

## 2024-12-18 RX ORDER — ONDANSETRON HYDROCHLORIDE 2 MG/ML
4 INJECTION, SOLUTION INTRAVENOUS ONCE
Status: COMPLETED | OUTPATIENT
Start: 2024-12-18 | End: 2024-12-18

## 2024-12-18 RX ORDER — IBUPROFEN 400 MG/1
400 TABLET ORAL EVERY 8 HOURS PRN
Status: DISCONTINUED | OUTPATIENT
Start: 2024-12-18 | End: 2024-12-18 | Stop reason: HOSPADM

## 2024-12-18 RX ORDER — LIDOCAINE HYDROCHLORIDE 20 MG/ML
JELLY TOPICAL AS NEEDED
Status: DISCONTINUED | OUTPATIENT
Start: 2024-12-18 | End: 2024-12-18 | Stop reason: HOSPADM

## 2024-12-18 RX ORDER — ASPIRIN 325 MG
325 TABLET ORAL DAILY
Start: 2024-12-18 | End: 2025-01-15

## 2024-12-18 ASSESSMENT — PAIN - FUNCTIONAL ASSESSMENT
PAIN_FUNCTIONAL_ASSESSMENT: 0-10

## 2024-12-18 ASSESSMENT — ENCOUNTER SYMPTOMS
SHORTNESS OF BREATH: 0
CONSTITUTIONAL NEGATIVE: 1
NEUROLOGICAL NEGATIVE: 1
RESPIRATORY NEGATIVE: 1
DIZZINESS: 0
LIGHT-HEADEDNESS: 0
ALLERGIC/IMMUNOLOGIC NEGATIVE: 1
CARDIOVASCULAR NEGATIVE: 1
PSYCHIATRIC NEGATIVE: 1
HEMATOLOGIC/LYMPHATIC NEGATIVE: 1
PALPITATIONS: 0
EYES NEGATIVE: 1
MUSCULOSKELETAL NEGATIVE: 1
GASTROINTESTINAL NEGATIVE: 1
WEAKNESS: 0
ENDOCRINE NEGATIVE: 1
CHEST TIGHTNESS: 0

## 2024-12-18 ASSESSMENT — COLUMBIA-SUICIDE SEVERITY RATING SCALE - C-SSRS
6. HAVE YOU EVER DONE ANYTHING, STARTED TO DO ANYTHING, OR PREPARED TO DO ANYTHING TO END YOUR LIFE?: NO
2. HAVE YOU ACTUALLY HAD ANY THOUGHTS OF KILLING YOURSELF?: NO
1. IN THE PAST MONTH, HAVE YOU WISHED YOU WERE DEAD OR WISHED YOU COULD GO TO SLEEP AND NOT WAKE UP?: NO

## 2024-12-18 ASSESSMENT — PAIN SCALES - GENERAL
PAINLEVEL_OUTOF10: 4
PAINLEVEL_OUTOF10: 0 - NO PAIN
PAINLEVEL_OUTOF10: 2
PAINLEVEL_OUTOF10: 0 - NO PAIN
PAINLEVEL_OUTOF10: 4
PAINLEVEL_OUTOF10: 0 - NO PAIN

## 2024-12-18 ASSESSMENT — PAIN DESCRIPTION - LOCATION: LOCATION: BACK

## 2024-12-18 NOTE — Clinical Note
Vascades deployed X3 successfully. Manually pressure held until hemostasis achieved. Site covered with 4X4 and tegaderm.

## 2024-12-18 NOTE — H&P (VIEW-ONLY)
History Of Present Illness  Terri Ley is a 69 y.o. female with past medical history significant for recent new onset atrial flutter, s/p KASSI guided DCC at [facility] (9/2024) with early recurrence of a flutter approximately 11 days later, currently maintained on metoprolol succinate 50 mg twice daily and anticoagulated with Eliquis 5 mg twice daily, HLD, recent new diagnosis of GAB was evaluated by Dr. Perez on 10/28/2024 for further management of persistent A-flutter/A-fib.  It was recommended patient undergo KASSI/EPS/possible a flutter ablation.  Patient is at [facility] today for these procedures under the care of Dr. Valadez and Dr. Perez.    Patient denies recent complaints of illness, fevers, chills, sweats or exposure to COVID-19.  Denies complaints of lightheadedness, dizziness, headache, chest pain, shortness of breath or palpitations.  Denies complaints of nausea, vomiting, diarrhea or constipation.  Denies complaints of lower extremity weakness or edema.     Past Medical History  Past Medical History:   Diagnosis Date    Atrial flutter (Multi)     Squamous cell skin cancer 03/30/2023    In situ, left thigh, excised by Dr Rhodes 04/11/2023     Surgical History  Past Surgical History:   Procedure Laterality Date    CARDIOVERSION  09/30/2024    [facility] main campus     Social History  Denies tobacco use  Denies alcohol use  Denies drug use    Family History  Family History   Problem Relation Name Age of Onset    Heart disease Father      Heart disease Brother       Allergies  Milk containing products (dairy)    Review of Systems   Constitutional: Negative.    HENT: Negative.     Eyes: Negative.    Respiratory: Negative.  Negative for chest tightness and shortness of breath.    Cardiovascular: Negative.  Negative for chest pain and palpitations.   Gastrointestinal: Negative.    Endocrine: Negative.    Genitourinary: Negative.    Musculoskeletal: Negative.    Skin: Negative.    Allergic/Immunologic:  "Negative.    Neurological: Negative.  Negative for dizziness, weakness and light-headedness.   Hematological: Negative.    Psychiatric/Behavioral: Negative.       Physical Exam  Vitals reviewed.   Constitutional:       Appearance: Normal appearance.   HENT:      Head: Normocephalic and atraumatic.      Nose: Nose normal.      Mouth/Throat:      Mouth: Mucous membranes are moist.      Pharynx: Oropharynx is clear.   Eyes:      Pupils: Pupils are equal, round, and reactive to light.   Cardiovascular:      Rate and Rhythm: Normal rate and regular rhythm.      Pulses: Normal pulses.      Heart sounds: Normal heart sounds.   Pulmonary:      Effort: Pulmonary effort is normal.      Breath sounds: Normal breath sounds.   Abdominal:      General: Bowel sounds are normal.      Palpations: Abdomen is soft.   Musculoskeletal:         General: Normal range of motion.      Cervical back: Normal range of motion and neck supple.   Skin:     General: Skin is warm and dry.   Neurological:      General: No focal deficit present.      Mental Status: She is alert and oriented to person, place, and time.   Psychiatric:         Mood and Affect: Mood normal.         Behavior: Behavior normal.       Last Recorded Vitals  Blood pressure 141/84, pulse 88, temperature 36.2 °C (97.2 °F), temperature source Temporal, resp. rate 16, height 1.626 m (5' 4\"), weight 62.1 kg (136 lb 14.5 oz), SpO2 97%.    Relevant Results    Results for orders placed or performed during the hospital encounter of 12/18/24 (from the past 24 hours)   ECG 12 lead STAT   Result Value Ref Range    Ventricular Rate 71 BPM    Atrial Rate 264 BPM    QRS Duration 100 ms    QT Interval 412 ms    QTC Calculation(Bazett) 447 ms    P Axis 270 degrees    R Axis 64 degrees    T Axis 87 degrees    QRS Count 12 beats    Q Onset 221 ms    T Offset 427 ms    QTC Fredericia 436 ms        Assessment/Plan   Persistent Atrial Flutter/A-fib   -Currently maintained on metoprolol succinate 50 " mg twice daily and anticoagulated with Eliquis 5 mg twice daily/bridged with Lovenox.   -KASSI/EPS/possible a flutter ablation under the care of Dr. Valadez and Dr. Perez.  2.   Obstructive sleep apnea  3.   Hyperlipidemia -on statin therapy    Further recommendations pending procedure results.  I spent 75 minutes in the professional and overall care of this patient.      Daniella Lees, APRN-CNP

## 2024-12-18 NOTE — DISCHARGE INSTRUCTIONS
Discharge instructions after an electrophysiology study / ablation procedure    After a procedure using sedation  You should return home and rest for the remainder of the day and evening.   It is recommended a responsible adult be with you for the first 24 hours after the procedure.  Do not make any legal decisions for 24 hours after your procedure.  Do not drink alcoholic beverages for 24 hours after your procedure.    Call 911 or seek medical attention for:  Severe chest pain  Change in mental status or weakness in extremities.  Dizziness, light headedness, or feeling faint.  If there is a large amount of bleeding or spurting of blood.  DO NOT drive yourself to the hospital.    Activity/ After care  Avoid heavy lifting (over 10 pounds), strenuous activity/exercise, squatting or excessive bending for 7 days.  Avoid sexual activity for 3 days and until any groin discomfort has ceased.  No driving for 48 hours after procedure.  You may experience slight chest discomfort post procedurally due to inflammation in the heart from the ablation    Groin site care  The transparent dressing should be removed from the site 24 hours after the procedure.    It is ok to shower after transparent dressing is removed  Wash the site gently with soap and water.   Rinse well and pat dry.  You may apply a Band-Aid to the site.   Avoid lotions, ointments, or powders until fully healed.  No submerged bathing, swimming, or hot tubs for the next 7 days, or until fully healed.  You may take acetaminophen (Tylenol) as directed for discomfort.      Notify your provider  If you develop a large area of bruising or a large lump.  It is normal to notice a small bruise around the puncture site and/or a small lump.   If bleeding should occur, lay down and apply pressure to the affected area for 15 minutes.    If groin pain is not relieved with acetaminophen (Tylenol).  If you develop tingling, numbness, pain, or coolness in the leg/arm beyond the  site where the procedure was performed.  If post procedure chest discomfort unrelieved with acetaminophen (Tylenol)    Follow up appointments  You will be scheduled for a follow up appointment with your provider in 3-4 months.  A heart monitor may be ordered prior to your provider appointment if indicated.  Any necessary appointments will be scheduled and appear on your After Visit Summary.

## 2024-12-18 NOTE — INTERVAL H&P NOTE
Late entry:    H&P reviewed. The patient was examined and there are no changes to the H&P.    In addition, reviewed KASSI with Dr. Valadez.    Lab Results   Component Value Date    WBC 6.0 12/12/2024    HGB 15.2 12/12/2024    HCT 48.6 (H) 12/12/2024    MCV 94 12/12/2024     12/12/2024      Lab Results   Component Value Date    GLUCOSE 101 (H) 12/18/2024    CALCIUM 9.1 12/18/2024     12/18/2024    K 4.3 12/18/2024    CO2 24 12/18/2024     (H) 12/18/2024    BUN 13 12/18/2024    CREATININE 0.63 12/18/2024      Lab Results   Component Value Date    INR 1.1 12/18/2024    INR 1.3 (H) 12/12/2024    INR 1.1 12/05/2022    PROTIME 12.7 12/18/2024    PROTIME 15.1 (H) 12/12/2024    PROTIME 12.6 12/05/2022      Counseling over 50% visit performed. The patient and I discussed preop cardiac evaluation, elevated Hb, types of tachycardia, goals of EP study ablation, treatment options, risks, benefits, and imponderables. All questions answered. Zofran to be given for nausea.  Econsent confirmed. Pt appreciative of care.

## 2024-12-18 NOTE — PRE-SEDATION DOCUMENTATION
Late entry for 12:30 PM    Sedation Plan    ASA 2     Mallampati class: II.    Risks, benefits, and alternatives discussed with patient.

## 2024-12-18 NOTE — H&P
History Of Present Illness  Terri Ley is a 69 y.o. female with past medical history significant for recent new onset atrial flutter, s/p KASSI guided DCC at [facility] (9/2024) with early recurrence of a flutter approximately 11 days later, currently maintained on metoprolol succinate 50 mg twice daily and anticoagulated with Eliquis 5 mg twice daily, HLD, recent new diagnosis of GAB was evaluated by Dr. Perez on 10/28/2024 for further management of persistent A-flutter/A-fib.  It was recommended patient undergo KASSI/EPS/possible a flutter ablation.  Patient is at [facility] today for these procedures under the care of Dr. Valadez and Dr. Perez.    Patient denies recent complaints of illness, fevers, chills, sweats or exposure to COVID-19.  Denies complaints of lightheadedness, dizziness, headache, chest pain, shortness of breath or palpitations.  Denies complaints of nausea, vomiting, diarrhea or constipation.  Denies complaints of lower extremity weakness or edema.     Past Medical History  Past Medical History:   Diagnosis Date    Atrial flutter (Multi)     Squamous cell skin cancer 03/30/2023    In situ, left thigh, excised by Dr Rhodes 04/11/2023     Surgical History  Past Surgical History:   Procedure Laterality Date    CARDIOVERSION  09/30/2024    [facility] main [facility]     Social History  Denies tobacco use  Denies alcohol use  Denies drug use    Family History  Family History   Problem Relation Name Age of Onset    Heart disease Father      Heart disease Brother       Allergies  Milk containing products (dairy)    Review of Systems   Constitutional: Negative.    HENT: Negative.     Eyes: Negative.    Respiratory: Negative.  Negative for chest tightness and shortness of breath.    Cardiovascular: Negative.  Negative for chest pain and palpitations.   Gastrointestinal: Negative.    Endocrine: Negative.    Genitourinary: Negative.    Musculoskeletal: Negative.    Skin: Negative.    Allergic/Immunologic:  "Negative.    Neurological: Negative.  Negative for dizziness, weakness and light-headedness.   Hematological: Negative.    Psychiatric/Behavioral: Negative.       Physical Exam  Vitals reviewed.   Constitutional:       Appearance: Normal appearance.   HENT:      Head: Normocephalic and atraumatic.      Nose: Nose normal.      Mouth/Throat:      Mouth: Mucous membranes are moist.      Pharynx: Oropharynx is clear.   Eyes:      Pupils: Pupils are equal, round, and reactive to light.   Cardiovascular:      Rate and Rhythm: Normal rate and regular rhythm.      Pulses: Normal pulses.      Heart sounds: Normal heart sounds.   Pulmonary:      Effort: Pulmonary effort is normal.      Breath sounds: Normal breath sounds.   Abdominal:      General: Bowel sounds are normal.      Palpations: Abdomen is soft.   Musculoskeletal:         General: Normal range of motion.      Cervical back: Normal range of motion and neck supple.   Skin:     General: Skin is warm and dry.   Neurological:      General: No focal deficit present.      Mental Status: She is alert and oriented to person, place, and time.   Psychiatric:         Mood and Affect: Mood normal.         Behavior: Behavior normal.       Last Recorded Vitals  Blood pressure 141/84, pulse 88, temperature 36.2 °C (97.2 °F), temperature source Temporal, resp. rate 16, height 1.626 m (5' 4\"), weight 62.1 kg (136 lb 14.5 oz), SpO2 97%.    Relevant Results    Results for orders placed or performed during the hospital encounter of 12/18/24 (from the past 24 hours)   ECG 12 lead STAT   Result Value Ref Range    Ventricular Rate 71 BPM    Atrial Rate 264 BPM    QRS Duration 100 ms    QT Interval 412 ms    QTC Calculation(Bazett) 447 ms    P Axis 270 degrees    R Axis 64 degrees    T Axis 87 degrees    QRS Count 12 beats    Q Onset 221 ms    T Offset 427 ms    QTC Fredericia 436 ms        Assessment/Plan   Persistent Atrial Flutter/A-fib   -Currently maintained on metoprolol succinate 50 " mg twice daily and anticoagulated with Eliquis 5 mg twice daily/bridged with Lovenox.   -KASSI/EPS/possible a flutter ablation under the care of Dr. Valadez and Dr. Perez.  2.   Obstructive sleep apnea  3.   Hyperlipidemia -on statin therapy    Further recommendations pending procedure results.  I spent 75 minutes in the professional and overall care of this patient.      Daniella Lees, APRN-CNP

## 2024-12-18 NOTE — POST-PROCEDURE NOTE
Physician Transition of Care Summary  Invasive Cardiovascular Lab    Procedure Date: 12/18/2024  Attending: * No surgeons found in log *  Resident/Fellow/Other Assistant: * No surgeons found in log *    Indications:   * No Diagnosis Codes entered *    Post-procedure diagnosis:   * No Diagnosis Codes entered *    Procedure(s):   * No procedures documented on diagnosis form *      Procedure Findings:   Moderate mitral regurgitation, moderate tricuspid regurgitation, dilated left atrium and right atrium, normal left ventricular function, no thrombus    Description of the Procedure:   Transesophageal echocardiogram with color Doppler and bubble saline    Complications:   None    Stents/Implants:   Implants       No implant documentation for this case.            Anticoagulation/Antiplatelet Plan:   None    Estimated Blood Loss:   * No values recorded between 12/18/2024 12:01 PM and 12/18/2024 12:23 PM *    Anesthesia: * No anesthesia type entered * Anesthesia Staff: No anesthesia staff entered.    Any Specimen(s) Removed:   Order Name Source Comment Collection Info Order Time   BASIC METABOLIC PANEL Blood, Venous  Collected By: ROSSY Erickson 12/18/2024 10:52 AM     Release result to [employer]   Immediate        COAGULATION SCREEN Blood, Venous  Collected By: ROSSY Erickson 12/18/2024 10:52 AM     Release result to MyChart   Immediate            Disposition:   To EP for ablation      Electronically signed by: Remy Valadez MD, 12/18/2024 12:23 PM

## 2024-12-18 NOTE — Clinical Note
COPD/PN Week 2 Survey      Responses   Facility patient discharged from?  [place]   Does the patient have one of the following disease processes/diagnoses(primary or secondary)?  COPD/Pneumonia   Was the primary reason for admission:  COPD exacerbation   Week 2 attempt successful?  Yes   Call start time  1307   Call end time  1311   Is patient permission given to speak with other caregiver?  Yes   List who call center can speak with  marine Crawford   Medication alerts for this patient  reviewed  medication with the patient and wife   Meds reviewed with patient/caregiver?  Yes   Is the patient having any side effects they believe may be caused by any medication additions or changes?  No   Does the patient have all medications ordered at discharge?  Yes   Is the patient taking all medications as directed (includes completed medication regime)?  Yes   Comments regarding appointments  has gotten appointments as schedule   Does the patient have a primary care provider?   Yes   Has the patient kept scheduled appointments due by today?  Yes   Did the patient receive a copy of their discharge instructions?  Yes   Nursing interventions  Reviewed instructions with patient   What is the patient's perception of their health status since discharge?  Improving   Are the patient's immunizations up to date?   Yes [flu ]   Is the patient/caregiver able to teach back the hierarchy of who to call/visit for symptoms/problems? PCP, Specialist, Home health nurse, Urgent Care, ED, 911  Yes   Is the patient able to teach back COPD zones?  Yes   Nursing interventions  Education provided on various zones   Patient reports what zone on this call?  Green Zone [wife feels green to yellow]   Green Zone  Reports doing well, Breathing without shortness of breath, Usual activity and exercise level, Appetite is good   Green Zone interventions:  Take daily medications, Continue regular exercise/diet plan, Avoid indoor/outdoor triggers   Week 2 call  The ECG shows an atrial flutter. completed?  Yes   Wrap up additional comments  has kept doctor, s appointment as scheduled          Sophie Sunshine RN

## 2024-12-18 NOTE — SIGNIFICANT EVENT
Upon arrival to room focused assessment performed and WDL. Right femoral site soft and stable with no hematoma or oozing. Educated Pt on current restrictions r/t right femoral venous punctures, she states understanding and denies needs at this time. No complaints of dizziness/lightheadedness/pain. Pt tearful d/t the relief of procedure being completed.  at bedside.

## 2024-12-18 NOTE — SIGNIFICANT EVENT
Pt arrived in room post KASSI procedure. Drowsy but arouses and speaks with RN when addressed,  at bedside. Pt denies dizziness/lightheadedness/pain and is resting comfortably.

## 2024-12-18 NOTE — NURSING NOTE
Discharge instructions reviewed with patient and spouse. Discussed in depth post procedure restrictions, new medications and follow up appointments. Questions and concerns addressed. Plan to dc home.

## 2024-12-18 NOTE — NURSING NOTE
Dr. Perez rounded on Pt at 1640. While speaking to the Pt she was complaining of upset stomach/nausea post ablation. While talking, patient sat herself up 25 degrees to drink the soda that was on her bedside table. After her conversation with the Pt and family was completed she came out to the nurses desk and asked the nurses whether the Pt was allowed to increase the head of the bed and stated that she was violating the bedrest. Went into room and assessed groin site. Right femoral site soft and stable with no hematoma or oozing. Pt's sheaths were pulled at 1538 with Vascade closure devices deployed at that time; one hour flat bedrest period was completed. HOB increased to 30 degrees per physician order.

## 2024-12-18 NOTE — NURSING NOTE
Patient's 2 hour bedrest completed. Right groin site remains stable and unchanged. Patient ambulated without difficulty to restroom and back. Right groin site assessed and unchanged. Resting with spouse at bedside. Will continue to monitor.

## 2024-12-19 LAB
ATRIAL RATE: 69 BPM
P AXIS: 74 DEGREES
P OFFSET: 184 MS
P ONSET: 125 MS
PR INTERVAL: 204 MS
Q ONSET: 227 MS
QRS COUNT: 11 BEATS
QRS DURATION: 88 MS
QT INTERVAL: 416 MS
QTC CALCULATION(BAZETT): 445 MS
QTC FREDERICIA: 436 MS
R AXIS: 32 DEGREES
T AXIS: 71 DEGREES
T OFFSET: 435 MS
VENTRICULAR RATE: 69 BPM

## 2024-12-20 DIAGNOSIS — I48.91 ATRIAL FIBRILLATION, UNSPECIFIED TYPE (MULTI): ICD-10-CM

## 2024-12-20 DIAGNOSIS — I48.92 ATRIAL FLUTTER, UNSPECIFIED TYPE (MULTI): ICD-10-CM

## 2024-12-20 RX ORDER — METOPROLOL TARTRATE 25 MG/1
TABLET, FILM COATED ORAL
Qty: 30 TABLET | Refills: 1 | Status: SHIPPED | OUTPATIENT
Start: 2024-12-20

## 2024-12-22 DIAGNOSIS — I48.91 ATRIAL FIBRILLATION, UNSPECIFIED TYPE (MULTI): ICD-10-CM

## 2024-12-22 DIAGNOSIS — I48.92 ATRIAL FLUTTER, UNSPECIFIED TYPE (MULTI): ICD-10-CM

## 2024-12-23 DIAGNOSIS — I48.91 ATRIAL FIBRILLATION, UNSPECIFIED TYPE (MULTI): Primary | ICD-10-CM

## 2024-12-23 RX ORDER — METOPROLOL SUCCINATE 50 MG/1
50 TABLET, EXTENDED RELEASE ORAL 2 TIMES DAILY
Qty: 180 TABLET | Refills: 3 | Status: SHIPPED | OUTPATIENT
Start: 2024-12-23

## 2024-12-23 NOTE — TELEPHONE ENCOUNTER
Pt left multiple voicemails stating she was discharged from ablation to take metoprolol succinate 50mg BID and she is running out of med.

## 2024-12-23 NOTE — TELEPHONE ENCOUNTER
Rx Refill Request Telephone Encounter    Pharm keeps sending in request for WRONG prescription. Patient does not need the metoprolol Tartrate    Name:  Terri Ley  :  [id number]  Medication Name: Metoprolol Succinate XL 50mg  twice a day  Specific Pharmacy location: [facility]  Date of last appointment: 24  Date of next appointment:  25  Best number to reach patient:      Patient just had ablation. She is almost out of this medication

## 2024-12-30 ENCOUNTER — TELEPHONE (OUTPATIENT)
Dept: CARDIOLOGY | Facility: CLINIC | Age: 69
End: 2024-12-30
Payer: MEDICARE

## 2024-12-30 DIAGNOSIS — I48.92 ATRIAL FLUTTER, UNSPECIFIED TYPE (MULTI): ICD-10-CM

## 2024-12-30 DIAGNOSIS — I48.0 PAROXYSMAL ATRIAL FIBRILLATION (MULTI): ICD-10-CM

## 2024-12-30 NOTE — TELEPHONE ENCOUNTER
Patient called stating she had an ablation on 12/18/24 with Dr Perez and as of yesterday, she believes she is back in atrial flutter. Hr ranging from  and occasionally reaching 150. Patient is asymptomatic.   Wants to know what the next steps will be?

## 2024-12-31 NOTE — TELEPHONE ENCOUNTER
Per DR. Good Snyder M.D. patient to be ordered 30 day event monitor to be worn. Called and l/m for patient regarding DR. Good Snyder M.D. recommendation.

## 2024-12-31 NOTE — TELEPHONE ENCOUNTER
I called and spoke to patient who is 13 days post Ablation.  She states her Apple watch is showing HR fluctuating from 90's to 150.  She is asymptomatic at this time, however is upset that she is probably back out of rhythm.  She would like to know what the next step is and should she be seen in the office.  She is going on vacation at the end of February and is unsure if this is okay.  Sent to DR. Good Snyder M.D. for review.

## 2025-01-02 ENCOUNTER — TELEPHONE (OUTPATIENT)
Dept: CARDIOLOGY | Facility: CLINIC | Age: 70
End: 2025-01-02
Payer: MEDICARE

## 2025-01-02 NOTE — TELEPHONE ENCOUNTER
Patient would like to know if there are any restrictions that she needs to worry about. She just had an ablation and would like to get back to her regular, very active life style. She also says that 10 days after ablation she went back into a-flutter and is concerned about continuing activities. She also wants to know what the next steps for her will be.

## 2025-01-03 RX ORDER — METOPROLOL TARTRATE 25 MG/1
TABLET, FILM COATED ORAL
Qty: 90 TABLET | Refills: 2 | Status: SHIPPED | OUTPATIENT
Start: 2025-01-03

## 2025-01-07 ENCOUNTER — APPOINTMENT (OUTPATIENT)
Dept: CARDIOLOGY | Facility: CLINIC | Age: 70
End: 2025-01-07
Payer: MEDICARE

## 2025-01-07 DIAGNOSIS — I48.92 ATRIAL FLUTTER, UNSPECIFIED TYPE (MULTI): ICD-10-CM

## 2025-01-07 DIAGNOSIS — I48.0 PAROXYSMAL ATRIAL FIBRILLATION (MULTI): ICD-10-CM

## 2025-01-14 ENCOUNTER — APPOINTMENT (OUTPATIENT)
Dept: CARDIOLOGY | Facility: CLINIC | Age: 70
End: 2025-01-14
Payer: MEDICARE

## 2025-01-14 VITALS
HEIGHT: 64 IN | DIASTOLIC BLOOD PRESSURE: 72 MMHG | BODY MASS INDEX: 23.73 KG/M2 | SYSTOLIC BLOOD PRESSURE: 122 MMHG | WEIGHT: 139 LBS | HEART RATE: 100 BPM

## 2025-01-14 DIAGNOSIS — Z78.9 NEVER SMOKED CIGARETTES: ICD-10-CM

## 2025-01-14 DIAGNOSIS — Z01.810 PREOP CARDIOVASCULAR EXAM: ICD-10-CM

## 2025-01-14 DIAGNOSIS — I34.0 MITRAL VALVE INSUFFICIENCY, UNSPECIFIED ETIOLOGY: ICD-10-CM

## 2025-01-14 DIAGNOSIS — I48.0 PAROXYSMAL ATRIAL FIBRILLATION (MULTI): ICD-10-CM

## 2025-01-14 DIAGNOSIS — I48.92 ATRIAL FLUTTER, UNSPECIFIED TYPE (MULTI): ICD-10-CM

## 2025-01-14 DIAGNOSIS — Z71.89 ENCOUNTER TO DISCUSS TREATMENT OPTIONS: ICD-10-CM

## 2025-01-14 DIAGNOSIS — R93.1 ABNORMAL ECHOCARDIOGRAM: Primary | ICD-10-CM

## 2025-01-14 DIAGNOSIS — I47.19 ATRIAL TACHYCARDIA (CMS-HCC): ICD-10-CM

## 2025-01-14 DIAGNOSIS — Z71.89 ENCOUNTER FOR MEDICATION REVIEW AND COUNSELING: ICD-10-CM

## 2025-01-14 PROCEDURE — 3008F BODY MASS INDEX DOCD: CPT | Performed by: INTERNAL MEDICINE

## 2025-01-14 PROCEDURE — 1159F MED LIST DOCD IN RCRD: CPT | Performed by: INTERNAL MEDICINE

## 2025-01-14 PROCEDURE — 1036F TOBACCO NON-USER: CPT | Performed by: INTERNAL MEDICINE

## 2025-01-14 PROCEDURE — 93000 ELECTROCARDIOGRAM COMPLETE: CPT | Performed by: INTERNAL MEDICINE

## 2025-01-14 PROCEDURE — 99215 OFFICE O/P EST HI 40 MIN: CPT | Performed by: INTERNAL MEDICINE

## 2025-01-14 ASSESSMENT — ENCOUNTER SYMPTOMS
PALPITATIONS: 1
DYSPNEA ON EXERTION: 1

## 2025-01-14 NOTE — PATIENT INSTRUCTIONS
Pre-Procedure Patient Information    You have been scheduled for: cardioversion  At: [facility]  With: Dr. Perez  Date of procedure: 1-16-25/REPORT TO 2ND FLOOR OUTPATIENT AREA    1. Please have transportation to and from the hospital. While you should plan for same-day discharge there is a possibility you will need to stay overnight.    2. You will receive a call from the hospital 24  hours before your procedure providing you with fasting instructions, procedure location detail, and time of arrival.  If you have not received a call from the hospital by 6 pm the day before your scheduled procedure, please call [phone].    3. Please bring a current list of medications with you to the hospital.      4. Medications to hold:     NONE    5. Nothing to eat after midnight before the procedure. OK to take morning medications, with above exceptions, the day of the procedure with a small sip of water.     6. Please bring to our attention if you have any contrast, latex or metal allergies.    7. Please have your blood work as instructed completed at least a day before your procedure.    8. If you have any questions, please contact the office at [phone].    REFER to Dr. Hollis at [place]   SCHEDULE cardioversion. Obtain labs within 1 week of procedure. Orders are in the system.   Follow up with Dr. Perez in 6 months     YOU MAY TAKE:  Coricidin HBP  Regular claritin without the decongestant.     DO NOT TAKE: STEF Perez RN, AM SCRIBING FOR AND IN THE PRESENCE OF DR. FRIEDA PEREZ MD, FACC, FACP, FHRS

## 2025-01-14 NOTE — PROGRESS NOTES
"Chief Complaint:   Follow-up (Patient states she is out of rhythm and feels symptoms such as palpitations, shortness of breath, fatigue )     History Of Present Illness:    Terri Ley is a 69 y.o. female presenting with followup.    She had ablation of atrial flutter and atrial tachycardia last month.  She is here for followup.    ECG's reviewed with patient.     Patient denies any arrhythmia symptoms of palpitation, lightheadedness, near syncope, or syncope.    Reviewed her list of questions.     Last Recorded Vitals:  Vitals:    01/14/25 1320   BP: 122/72   Pulse: 100   Weight: 63 kg (139 lb)   Height: 1.626 m (5' 4\")       Past Medical History:  See List     Past Surgical History:  See List       Social History:  She reports that she has never smoked. She has never used smokeless tobacco. She reports that she does not currently use alcohol. She reports that she does not currently use drugs.    Family History:  Family History   Problem Relation Name Age of Onset    Breast cancer Mother      Heart disease Father      Heart disease Brother      Heart disease Maternal Grandfather      Breast cancer Paternal Grandmother      Heart disease Paternal Grandfather          Allergies:  Patient has no known allergies.    Outpatient Medications:  Current Outpatient Medications   Medication Instructions    apixaban (ELIQUIS) 5 mg, oral, 2 times daily, Resume in am 12/19/24    aspirin 325 mg, oral, Daily, For weeks after ablation then discontinue    cholecalciferol (Vitamin D3) 50 mcg (2,000 unit) capsule 1 capsule DAILY (route: oral)    lifitegrast (Xiidra) 5 % dropperette Per instructions 2 TIMES DAILY (route: ophthalmic (eye))    metoprolol succinate XL (TOPROL-XL) 50 mg, oral, 2 times daily    metoprolol tartrate (Lopressor) 25 mg tablet TAKE 1 TABLET DAILY AS NEEDED FOR BREAKTHROUGH PALPITATIONS LASTING LONGER THAN 15 MINUTES    multivitamin with minerals (multivitamin-iron-folic acid) tablet 1 tablet, Daily    " "rosuvastatin (Crestor) 5 mg tablet Take 1 tablet (5 mg) by mouth 3 times a week.   Review of Systems   Constitutional: Positive for malaise/fatigue.   Cardiovascular:  Positive for dyspnea on exertion and palpitations. Negative for chest pain.   All other systems reviewed and are negative.        Physical Exam:  Constitutional:       General: Awake.      Appearance: Normal and healthy appearance. Well-developed and not in distress.   Neck:      Vascular: No JVR. JVD normal.   Pulmonary:      Effort: Pulmonary effort is normal.      Breath sounds: Normal breath sounds. No wheezing. No rhonchi. No rales.   Chest:      Chest wall: Not tender to palpatation.   Cardiovascular:      PMI at left midclavicular line. Tachycardia present. Regular rhythm. Normal S1. Normal S2.       Murmurs: There is no murmur.      No gallop.  No click. No rub.   Pulses:     Intact distal pulses.   Edema:     Peripheral edema absent.   Abdominal:      Tenderness: There is no abdominal tenderness.   Musculoskeletal: Normal range of motion.         General: No tenderness. Skin:     General: Skin is warm and dry.   Neurological:      General: No focal deficit present.      Mental Status: Alert and oriented to person, place and time.          Last Labs:  CBC -  Lab Results   Component Value Date    WBC 6.0 12/12/2024    HGB 15.2 12/12/2024    HCT 48.6 (H) 12/12/2024    MCV 94 12/12/2024     12/12/2024       CMP -  Lab Results   Component Value Date    CALCIUM 9.1 12/18/2024    PROT 6.8 12/05/2022    ALBUMIN 4.6 12/05/2022    AST 23 12/05/2022    ALT 33 12/05/2022    ALKPHOS 69 12/05/2022    BILITOT 0.8 12/05/2022       LIPID PANEL -   No results found for: \"CHOL\", \"TRIG\", \"HDL\", \"CHHDL\", \"LDLF\", \"VLDL\", \"NHDL\"    RENAL FUNCTION PANEL -   Lab Results   Component Value Date    GLUCOSE 101 (H) 12/18/2024     12/18/2024    K 4.3 12/18/2024     (H) 12/18/2024    CO2 24 12/18/2024    ANIONGAP 12 12/18/2024    BUN 13 12/18/2024    " CREATININE 0.63 12/18/2024    CALCIUM 9.1 12/18/2024    ALBUMIN 4.6 12/05/2022        Lab Results   Component Value Date    HGBA1C 5.5 10/29/2024       Last Cardiology Tests:  ECG:  Electrocardiogram - Post Ablation 12/18/2024  Today. Atypical atrial flutter.  Normal axis.  ms    Echo:  Transesophageal Echo (KASSI) 12/18/2024      Ejection Fractions:  EF   Date/Time Value Ref Range Status   12/18/2024 12:28 PM 53 %          Lab review: I have personally reviewed the laboratory result(s) see above    Assessment/Plan   Diagnoses and all orders for this visit:  Paroxysmal atrial fibrillation (Multi)  Atrial flutter, unspecified type (Multi)  Encounter for medication review and counseling  Encounter to discuss treatment options  Never smoked cigarettes  BMI 23.0-23.9, adult        Guillermina Boateng RN    Typical atrial flutter.  Variable AV conduction.  S/p ablation Dec 2024. Undelying biatrial enlargement and mod MR.  Atrial tachycardia. S/p ablation Dec 2024  Now with atypical atrial flutter. Remains in inflammatory stage post ablation. Would opt for CVN and monitor clinically. Shared decision making performed. Econsent obtained for cardioversion. If recurrence after inflammation subsides then would consider EP study, mapping, and ablation in future.  High risk medication Eliquis. Continue for now given atypical atrial flutter.  Abnormal KASSI with mod sev LA enlargement and mod MR. She requests to transfer care to .  Will refer to Dr. Hollis for evaluation and if other testing needed of cardiac substrate  Basal cell carcinoma.  She follows for Moh's surgery.     Counseling greater than 50% of visit performed.  Patient and I discussed normal conduction, atrial flutter, atypical atrial flutter, atrial tachycardia, shared decision making, anticoagulation standards, ECG, cardioversion, valvular heart disease, KASSI findings, preop cardiac evaluation for cardioversion, treatment options, risk, benefits, and  imponderables.  All questions answered.  Patient appreciative care  Extended time of visit for review of ablation, treatment options, and preop cardiac evaluation

## 2025-01-16 ENCOUNTER — ANESTHESIA EVENT (OUTPATIENT)
Dept: CARDIOLOGY | Facility: HOSPITAL | Age: 70
End: 2025-01-16
Payer: MEDICARE

## 2025-01-16 ENCOUNTER — HOSPITAL ENCOUNTER (OUTPATIENT)
Dept: CARDIOLOGY | Facility: HOSPITAL | Age: 70
Discharge: HOME | End: 2025-01-16
Payer: MEDICARE

## 2025-01-16 ENCOUNTER — ANESTHESIA (OUTPATIENT)
Dept: CARDIOLOGY | Facility: HOSPITAL | Age: 70
End: 2025-01-16
Payer: MEDICARE

## 2025-01-16 VITALS
DIASTOLIC BLOOD PRESSURE: 71 MMHG | SYSTOLIC BLOOD PRESSURE: 119 MMHG | BODY MASS INDEX: 23.18 KG/M2 | WEIGHT: 135.8 LBS | RESPIRATION RATE: 16 BRPM | OXYGEN SATURATION: 97 % | HEART RATE: 75 BPM | TEMPERATURE: 98.6 F | HEIGHT: 64 IN

## 2025-01-16 DIAGNOSIS — I47.19 ATRIAL TACHYCARDIA (CMS-HCC): ICD-10-CM

## 2025-01-16 LAB
ANION GAP SERPL CALC-SCNC: 13 MMOL/L (ref 10–20)
APTT PPP: 40 SECONDS (ref 27–38)
BODY SURFACE AREA: 1.67 M2
BUN SERPL-MCNC: 16 MG/DL (ref 6–23)
CALCIUM SERPL-MCNC: 9.4 MG/DL (ref 8.6–10.3)
CHLORIDE SERPL-SCNC: 107 MMOL/L (ref 98–107)
CO2 SERPL-SCNC: 24 MMOL/L (ref 21–32)
CREAT SERPL-MCNC: 0.63 MG/DL (ref 0.5–1.05)
EGFRCR SERPLBLD CKD-EPI 2021: >90 ML/MIN/1.73M*2
GLUCOSE SERPL-MCNC: 99 MG/DL (ref 74–99)
INR PPP: 1.7 (ref 0.9–1.1)
POTASSIUM SERPL-SCNC: 4.5 MMOL/L (ref 3.5–5.3)
PROTHROMBIN TIME: 19.5 SECONDS (ref 9.8–12.8)
SODIUM SERPL-SCNC: 139 MMOL/L (ref 136–145)

## 2025-01-16 PROCEDURE — 80048 BASIC METABOLIC PNL TOTAL CA: CPT | Performed by: NURSE PRACTITIONER

## 2025-01-16 PROCEDURE — 92960 CARDIOVERSION ELECTRIC EXT: CPT | Performed by: INTERNAL MEDICINE

## 2025-01-16 PROCEDURE — 7100000009 HC PHASE TWO TIME - INITIAL BASE CHARGE

## 2025-01-16 PROCEDURE — 93005 ELECTROCARDIOGRAM TRACING: CPT

## 2025-01-16 PROCEDURE — 36415 COLL VENOUS BLD VENIPUNCTURE: CPT | Performed by: NURSE PRACTITIONER

## 2025-01-16 PROCEDURE — 3700000001 HC GENERAL ANESTHESIA TIME - INITIAL BASE CHARGE

## 2025-01-16 PROCEDURE — 2500000004 HC RX 250 GENERAL PHARMACY W/ HCPCS (ALT 636 FOR OP/ED)

## 2025-01-16 PROCEDURE — 7100000010 HC PHASE TWO TIME - EACH INCREMENTAL 1 MINUTE

## 2025-01-16 PROCEDURE — 3700000002 HC GENERAL ANESTHESIA TIME - EACH INCREMENTAL 1 MINUTE

## 2025-01-16 PROCEDURE — 7100000002 HC RECOVERY ROOM TIME - EACH INCREMENTAL 1 MINUTE

## 2025-01-16 PROCEDURE — 92960 CARDIOVERSION ELECTRIC EXT: CPT

## 2025-01-16 PROCEDURE — 85610 PROTHROMBIN TIME: CPT | Performed by: NURSE PRACTITIONER

## 2025-01-16 PROCEDURE — 7100000001 HC RECOVERY ROOM TIME - INITIAL BASE CHARGE

## 2025-01-16 RX ORDER — PROPOFOL 10 MG/ML
INJECTION, EMULSION INTRAVENOUS AS NEEDED
Status: DISCONTINUED | OUTPATIENT
Start: 2025-01-16 | End: 2025-01-16

## 2025-01-16 RX ORDER — ONDANSETRON HYDROCHLORIDE 2 MG/ML
INJECTION, SOLUTION INTRAVENOUS AS NEEDED
Status: DISCONTINUED | OUTPATIENT
Start: 2025-01-16 | End: 2025-01-16

## 2025-01-16 RX ADMIN — PROPOFOL 60 MG: 10 INJECTION, EMULSION INTRAVENOUS at 12:26

## 2025-01-16 RX ADMIN — ONDANSETRON 4 MG: 2 INJECTION, SOLUTION INTRAMUSCULAR; INTRAVENOUS at 12:19

## 2025-01-16 SDOH — HEALTH STABILITY: MENTAL HEALTH: CURRENT SMOKER: 0

## 2025-01-16 ASSESSMENT — PAIN - FUNCTIONAL ASSESSMENT
PAIN_FUNCTIONAL_ASSESSMENT: 0-10

## 2025-01-16 ASSESSMENT — PAIN SCALES - GENERAL
PAINLEVEL_OUTOF10: 0 - NO PAIN

## 2025-01-16 ASSESSMENT — COLUMBIA-SUICIDE SEVERITY RATING SCALE - C-SSRS
2. HAVE YOU ACTUALLY HAD ANY THOUGHTS OF KILLING YOURSELF?: NO
6. HAVE YOU EVER DONE ANYTHING, STARTED TO DO ANYTHING, OR PREPARED TO DO ANYTHING TO END YOUR LIFE?: NO
1. IN THE PAST MONTH, HAVE YOU WISHED YOU WERE DEAD OR WISHED YOU COULD GO TO SLEEP AND NOT WAKE UP?: NO

## 2025-01-16 NOTE — INTERVAL H&P NOTE
H&P reviewed. The patient was examined and there are no changes to the H&P.    In addition,  She inquires when she will have evaluation with Dr. Hollis.  We discussed that his office will contact her for appointment.    Patient denies any arrhythmia symptoms of palpitation, lightheadedness, near syncope, or syncope.    Lab Results   Component Value Date    WBC 6.0 12/12/2024    HGB 15.2 12/12/2024    HCT 48.6 (H) 12/12/2024    MCV 94 12/12/2024     12/12/2024      Lab Results   Component Value Date    GLUCOSE 99 01/16/2025    CALCIUM 9.4 01/16/2025     01/16/2025    K 4.5 01/16/2025    CO2 24 01/16/2025     01/16/2025    BUN 16 01/16/2025    CREATININE 0.63 01/16/2025      Lab Results   Component Value Date    INR 1.7 (H) 01/16/2025    INR 1.1 12/18/2024    INR 1.3 (H) 12/12/2024    PROTIME 19.5 (H) 01/16/2025    PROTIME 12.7 12/18/2024    PROTIME 15.1 (H) 12/12/2024      Counseling greater than 50% of the visit performed.  The patient and I discussed atrial tachycardia, atypical atrial flutter, inflammatory status, treatment options, and shared decision making, risk, benefits, and imponderables.  Also discussed underlying cardiac substrate.  All questions answered.  E consent confirmed.  Patient appreciative care

## 2025-01-16 NOTE — ANESTHESIA POSTPROCEDURE EVALUATION
Patient: Terri Ley    Procedure Summary       Date: 01/16/25 Room / Location: [facility]    Anesthesia Start: 1223 Anesthesia Stop:     Procedure: CARDIOVERSION EXTERNAL Diagnosis: Atrial tachycardia (CMS-HCC)    Scheduled Providers: Virginia Perez MD Responsible Provider: Bolivar Weston MD    Anesthesia Type: MAC ASA Status: 2            Anesthesia Type: MAC    Vitals Value Taken Time   /59 01/16/25 1231   Temp - 01/16/25 1231   Pulse 58 01/16/25 1231   Resp 16 01/16/25 1231   SpO2 98 01/16/25 1231       Anesthesia Post Evaluation    Patient location during evaluation: PACU  Patient participation: complete - patient participated  Level of consciousness: awake  Pain management: adequate  Multimodal analgesia pain management approach  Airway patency: patent  Cardiovascular status: acceptable  Respiratory status: acceptable and nasal cannula  Hydration status: acceptable  Postoperative Nausea and Vomiting: none        No notable events documented.

## 2025-01-16 NOTE — SIGNIFICANT EVENT
Focused assessment performed and WDL. Pt resting comfortably; drowsy but arouses when spoken to.  David in room with Pt. Denies dizziness/lightheadedness/pain. EKG paged to bedside.

## 2025-01-16 NOTE — ANESTHESIA PREPROCEDURE EVALUATION
Patient: Terri Ley    Procedure Information       Date/Time: 01/16/25 1215    Scheduled providers: Virginia Perez MD    Procedure: CARDIOVERSION EXTERNAL    Location: [facility]            Relevant Problems   Cardiac   (+) Atrial fibrillation (Multi)   (+) Atrial flutter (Multi)   (+) Atrial tachycardia (CMS-HCC)   (+) Mitral valve insufficiency   (+) Paroxysmal atrial fibrillation (Multi)      Skin   (+) Squamous cell skin cancer       Clinical information reviewed:    Allergies  Meds     OB Status           NPO Detail:  No data recorded     Physical Exam    Airway  Mallampati: II     Cardiovascular   Rhythm: irregular  Rate: abnormal     Dental - normal exam     Pulmonary    Abdominal            Anesthesia Plan    History of general anesthesia?: yes  History of complications of general anesthesia?: no    ASA 2     MAC     The patient is not a current smoker.    intravenous induction   Anesthetic plan and risks discussed with patient.    Plan discussed with CRNA and attending.

## 2025-01-16 NOTE — SIGNIFICANT EVENT
"Discharge instructions given via \"teach back\" method. Covered: Post cardioversion discharge instructions/restrictions, medications/when to resume them, and follow up appointments. Pt and  state understanding and answer follow up questions correctly. Pt ambulating around room with no complaints of dizziness/lightheadedness/pain. Ready to discharge home via wheelchair.  "

## 2025-01-17 LAB
ATRIAL RATE: 58 BPM
P AXIS: 72 DEGREES
P OFFSET: 194 MS
P ONSET: 135 MS
PR INTERVAL: 188 MS
Q ONSET: 229 MS
QRS COUNT: 9 BEATS
QRS DURATION: 78 MS
QT INTERVAL: 440 MS
QTC CALCULATION(BAZETT): 431 MS
QTC FREDERICIA: 434 MS
R AXIS: 17 DEGREES
T AXIS: 65 DEGREES
T OFFSET: 449 MS
VENTRICULAR RATE: 58 BPM

## 2025-01-18 LAB
ATRIAL RATE: 271 BPM
P AXIS: 94 DEGREES
P OFFSET: 191 MS
P ONSET: 109 MS
Q ONSET: 218 MS
QRS COUNT: 16 BEATS
QRS DURATION: 90 MS
QT INTERVAL: 388 MS
QTC CALCULATION(BAZETT): 500 MS
QTC FREDERICIA: 460 MS
R AXIS: -11 DEGREES
T AXIS: 77 DEGREES
T OFFSET: 412 MS
VENTRICULAR RATE: 100 BPM

## 2025-01-23 ENCOUNTER — APPOINTMENT (OUTPATIENT)
Dept: DERMATOLOGY | Facility: CLINIC | Age: 70
End: 2025-01-23
Payer: MEDICARE

## 2025-01-23 DIAGNOSIS — L81.4 LENTIGO: ICD-10-CM

## 2025-01-23 DIAGNOSIS — L90.5 SCAR CONDITIONS AND FIBROSIS OF SKIN: Primary | ICD-10-CM

## 2025-01-23 DIAGNOSIS — L57.0 ACTINIC KERATOSIS: ICD-10-CM

## 2025-01-23 DIAGNOSIS — L56.5 DSAP (DISSEMINATED SUPERFICIAL ACTINIC POROKERATOSIS): ICD-10-CM

## 2025-01-23 DIAGNOSIS — Z85.828 PERSONAL HISTORY OF SKIN CANCER: ICD-10-CM

## 2025-01-23 DIAGNOSIS — D18.01 HEMANGIOMA OF SKIN: ICD-10-CM

## 2025-01-23 DIAGNOSIS — D22.9 MELANOCYTIC NEVUS, UNSPECIFIED LOCATION: ICD-10-CM

## 2025-01-23 NOTE — PROGRESS NOTES
Subjective     Terri Ley is a 69 y.o. female who presents for the following: Skin Check (FBSE. Hx of SCCIS and BCC. No areas of concern for today. ).     Skin Cancer Screening  She has a history of heavy sun exposure. She is in the sun daily. She uses sunscreen daily. She reports itching. Her moles are not changing.    Spots that concern her: none    Hx off SCCIS 3/30/23 and BCC 9/23/24    Review of Systems:  No other skin or systemic complaints other than what is documented elsewhere in the note.    The following portions of the chart were reviewed this encounter and updated as appropriate:       Skin Cancer History  Biopsy Date Type Location Status   9/23/24 BCC Right Malar Cheek Treatment Complete  12/3/24     Specialty Problems          Dermatology Problems    Squamous cell skin cancer     In situ, left thigh, excised by Dr Rhodes 04/11/2023         Personal history of skin cancer     3/2023:  SCCis, left thigh, s/p excision w/ Dr. Rhodes  9/2024:  BCC, right malar cheek, s/p Mohs w/ Dr. Rhodes          Past Medical History:  Terri Ley  has a past medical history of Atrial flutter (Multi) and Squamous cell skin cancer (03/30/2023).    Past Surgical History:  Terri Ley  has a past surgical history that includes Cardioversion (09/30/2024); Cardiac electrophysiology procedure (N/A, 12/18/2024); Cardiac electrophysiology procedure (N/A, 12/18/2024); Total hip arthroplasty (Right); and Cataract extraction.    Family History:  Patient family history includes Breast cancer in her mother and paternal grandmother; Heart disease in her brother, father, maternal grandfather, and paternal grandfather.       Objective   Well appearing patient in no apparent distress; mood and affect are within normal limits.    A full examination was performed including scalp, head, eyes, ears, nose, lips, neck, chest, axillae, abdomen, back, buttocks, bilateral upper extremities, bilateral lower extremities,  hands, feet, fingers, toes, fingernails, and toenails. All findings within normal limits unless otherwise noted below.    Assessment/Plan   1. Personal history of skin cancer    2. Melanocytic nevus, unspecified location  All nevi were symmetric brown macules without atypia on dermoscopy.     The ABCDEs of melanoma and warning signs of non-melanoma skin cancer were discussed with patient and patient expressed understanding. Sun protection and use of at least SPF 30 discussed with patient. Pt instructed to reapply every 2 hours.     3. Lentigo  Scattered tan macules in sun-exposed areas.    The ABCDEs of melanoma and warning signs of non-melanoma skin cancer were discussed with patient and patient expressed understanding. Sun protection and use of at least SPF 30 discussed with patient. Pt instructed to reapply every 2 hours.     4. Hemangioma of skin  Scattered cherry-red papule(s).    5. Scar conditions and fibrosis of skin  Scar is well-healed without evidence of recurrence    6. DSAP (disseminated superficial actinic porokeratosis)  Fairly quiet, well controlled on cholesterol-lovastatin cream.     Will continue with cholesterol/lovastatin cream and regular skin checks. Sun protection and use of at least SPF 30 discussed with patient. Pt instructed to reapply every 2 hours.       7. Actinic keratosis (8)  Left Thigh - Anterior (2), Left Upper Arm - Anterior (3), Right Thigh - Anterior (2), Right Upper Arm - Anterior  Destruction of Actinic Keratosis Procedure Note  Diagnosis: AK  Location: see physical exam  Informed consent: Discussed risks (permanent scarring, light or dark discoloration, infection, pain, bleeding, bruising, redness, blister formation, and recurrence of the lesion) and benefits of the procedure, as well as the alternatives.  Informed consent was obtained.  Anesthesia: not required  Procedure Details:  The lesion was destroyed with liquid nitrogen. The patient tolerated procedure well.  Total  number of lesions treated:  8  Plan:  The patient was instructed on post-op care. Recommend OTC analgesia as needed for pain.      Destr of lesion - Left Thigh - Anterior (2), Left Upper Arm - Anterior (3), Right Thigh - Anterior (2), Right Upper Arm - Anterior  Complexity: simple    Destruction method: cryotherapy    Informed consent: discussed and consent obtained    Lesion destroyed using liquid nitrogen: Yes    Region frozen until ice ball extended beyond lesion: Yes    Cryotherapy cycles:  1  Outcome: patient tolerated procedure well with no complications    Post-procedure details: wound care instructions given         Follow up 6 months for FBSE or sooner as needed.

## 2025-01-30 ENCOUNTER — APPOINTMENT (OUTPATIENT)
Dept: PRIMARY CARE | Facility: CLINIC | Age: 70
End: 2025-01-30
Payer: MEDICARE

## 2025-01-30 VITALS
BODY MASS INDEX: 24.2 KG/M2 | HEIGHT: 63 IN | WEIGHT: 136.6 LBS | DIASTOLIC BLOOD PRESSURE: 72 MMHG | HEART RATE: 67 BPM | SYSTOLIC BLOOD PRESSURE: 104 MMHG | OXYGEN SATURATION: 98 %

## 2025-01-30 DIAGNOSIS — E55.9 VITAMIN D DEFICIENCY: ICD-10-CM

## 2025-01-30 DIAGNOSIS — Z78.9 HEALTH MAINTENANCE ALTERATION: Primary | ICD-10-CM

## 2025-01-30 DIAGNOSIS — Z78.0 MENOPAUSE: ICD-10-CM

## 2025-01-30 DIAGNOSIS — I47.19 ATRIAL TACHYCARDIA (CMS-HCC): ICD-10-CM

## 2025-01-30 DIAGNOSIS — R73.03 PREDIABETES: ICD-10-CM

## 2025-01-30 DIAGNOSIS — E78.5 HYPERLIPIDEMIA, UNSPECIFIED HYPERLIPIDEMIA TYPE: ICD-10-CM

## 2025-01-30 PROCEDURE — 3008F BODY MASS INDEX DOCD: CPT | Performed by: INTERNAL MEDICINE

## 2025-01-30 PROCEDURE — 1036F TOBACCO NON-USER: CPT | Performed by: INTERNAL MEDICINE

## 2025-01-30 PROCEDURE — 1159F MED LIST DOCD IN RCRD: CPT | Performed by: INTERNAL MEDICINE

## 2025-01-30 PROCEDURE — 99203 OFFICE O/P NEW LOW 30 MIN: CPT | Performed by: INTERNAL MEDICINE

## 2025-01-30 PROCEDURE — 1160F RVW MEDS BY RX/DR IN RCRD: CPT | Performed by: INTERNAL MEDICINE

## 2025-01-30 PROCEDURE — 90677 PCV20 VACCINE IM: CPT | Performed by: INTERNAL MEDICINE

## 2025-01-30 PROCEDURE — G0009 ADMIN PNEUMOCOCCAL VACCINE: HCPCS | Performed by: INTERNAL MEDICINE

## 2025-01-30 ASSESSMENT — ENCOUNTER SYMPTOMS
COUGH: 0
SORE THROAT: 0
NAUSEA: 0
DIZZINESS: 0
UNEXPECTED WEIGHT CHANGE: 0
VOMITING: 0
CHEST TIGHTNESS: 0
CHILLS: 0
DYSURIA: 0
FEVER: 0
DYSPHORIC MOOD: 0
PALPITATIONS: 0
CONSTIPATION: 0
WOUND: 0
NERVOUS/ANXIOUS: 0
DIARRHEA: 0
MYALGIAS: 0
HEMATURIA: 0
FREQUENCY: 0
WHEEZING: 0
HEADACHES: 0
POLYPHAGIA: 0
SHORTNESS OF BREATH: 0
BLOOD IN STOOL: 0
ABDOMINAL PAIN: 0
ARTHRALGIAS: 0
EYE PAIN: 0
RHINORRHEA: 0
POLYDIPSIA: 0

## 2025-01-30 ASSESSMENT — PATIENT HEALTH QUESTIONNAIRE - PHQ9
1. LITTLE INTEREST OR PLEASURE IN DOING THINGS: NOT AT ALL
SUM OF ALL RESPONSES TO PHQ9 QUESTIONS 1 AND 2: 0
2. FEELING DOWN, DEPRESSED OR HOPELESS: NOT AT ALL

## 2025-01-30 NOTE — PROGRESS NOTES
"Subjective   Patient ID: Terri Ley is a 69 y.o. female who presents for New Patient Visit.    HPI     Here as a new  patient   States was in good health prior to 9/24 where she was diagnosed with a flutter. Has had 2 cardioversions and ablation    She had sleep study in hospital and shows mild GAB. States does not seem accurate    Sees new primary cardiologist for enlarged LA and moderate MR    She overall feels well except for this    Review of Systems   Constitutional:  Negative for chills, fever and unexpected weight change.   HENT:  Negative for congestion, hearing loss, rhinorrhea and sore throat.    Eyes:  Negative for pain and visual disturbance.   Respiratory:  Negative for cough, chest tightness, shortness of breath and wheezing.    Cardiovascular:  Negative for chest pain and palpitations.   Gastrointestinal:  Negative for abdominal pain, blood in stool, constipation, diarrhea, nausea and vomiting.   Endocrine: Negative for cold intolerance, heat intolerance, polydipsia and polyphagia.   Genitourinary:  Negative for dysuria, frequency and hematuria.   Musculoskeletal:  Negative for arthralgias and myalgias.   Skin:  Negative for rash and wound.   Neurological:  Negative for dizziness, syncope and headaches.   Psychiatric/Behavioral:  Negative for dysphoric mood. The patient is not nervous/anxious.        Objective   /72 (BP Location: Left arm, Patient Position: Sitting, BP Cuff Size: Adult)   Pulse 67   Ht 1.6 m (5' 3\")   Wt 62 kg (136 lb 9.6 oz)   SpO2 98%   BMI 24.20 kg/m²     Physical Exam  Vitals reviewed.   Constitutional:       Appearance: Normal appearance. She is not ill-appearing.   HENT:      Head: Normocephalic and atraumatic.      Right Ear: Tympanic membrane normal.      Left Ear: Tympanic membrane normal.      Nose: Nose normal.      Mouth/Throat:      Mouth: Mucous membranes are moist.      Pharynx: Oropharynx is clear.   Eyes:      Extraocular Movements: Extraocular " movements intact.      Conjunctiva/sclera: Conjunctivae normal.      Pupils: Pupils are equal, round, and reactive to light.   Cardiovascular:      Rate and Rhythm: Normal rate and regular rhythm.   Pulmonary:      Effort: Pulmonary effort is normal.      Breath sounds: Normal breath sounds. No wheezing.   Abdominal:      General: There is no distension.      Palpations: Abdomen is soft. There is no mass.      Tenderness: There is no abdominal tenderness.   Musculoskeletal:      Cervical back: Neck supple.      Right lower leg: No edema.      Left lower leg: No edema.   Lymphadenopathy:      Cervical: No cervical adenopathy.   Neurological:      General: No focal deficit present.      Mental Status: She is alert and oriented to person, place, and time.      Gait: Gait normal.   Psychiatric:         Mood and Affect: Mood normal.         Behavior: Behavior normal.         Thought Content: Thought content normal.         Assessment/Plan   Problem List Items Addressed This Visit             ICD-10-CM    Atrial tachycardia (CMS-HCC) I47.19    Relevant Orders    CBC    Comprehensive Metabolic Panel     Other Visit Diagnoses         Codes    Health maintenance alteration    -  Primary Z78.9    Relevant Orders    Pneumococcal conjugate vaccine, 20-valent (PREVNAR 20) (Completed)    Vitamin D deficiency     E55.9    Relevant Orders    Vitamin D 25-Hydroxy,Total (for eval of Vitamin D levels)    Prediabetes     R73.03    Relevant Orders    Hemoglobin A1C    Hyperlipidemia, unspecified hyperlipidemia type     E78.5    Relevant Orders    Lipid Panel    Menopause     Z78.0    Relevant Orders    XR DEXA bone density             Atrial flutter s/p cardioversion x2 (9/24 and 12/24)  -occurred 9/24 admission  -had ablation 10/24-lasted 10 days  -off caffeine  -metoprolol and eliquis  -sees Dr. Virginia Perez and Dr. Santa  -has large LA-to see Dr. Hollis     Hyperlipidemia- on  crestor 3x per week    S/p cataracts-on  allison    GAB??    BCC- s/p Mohs-sees Dr. Casey    Prediabetes    Osteopenia- 4/23, ordered    S/p right hip replacement    [facility] in fall with labs before    Health Maintenance  -Pap smear: 12/23  -Vaccinations: Utd  flu, tdap, shingrix. Prevnar-20 today  -Mammogram: has scheduled  -Colonoscopy:through [facility]  -DEXA: 4/11/23-2 years

## 2025-02-04 ENCOUNTER — OFFICE VISIT (OUTPATIENT)
Dept: CARDIOLOGY | Facility: CLINIC | Age: 70
End: 2025-02-04
Payer: MEDICARE

## 2025-02-04 VITALS — SYSTOLIC BLOOD PRESSURE: 165 MMHG | DIASTOLIC BLOOD PRESSURE: 85 MMHG | OXYGEN SATURATION: 95 % | HEART RATE: 70 BPM

## 2025-02-04 DIAGNOSIS — I48.4 ATYPICAL ATRIAL FLUTTER (MULTI): Primary | ICD-10-CM

## 2025-02-04 DIAGNOSIS — I42.9 CARDIOMYOPATHY, UNSPECIFIED TYPE (MULTI): ICD-10-CM

## 2025-02-04 PROCEDURE — 99204 OFFICE O/P NEW MOD 45 MIN: CPT | Performed by: INTERNAL MEDICINE

## 2025-02-04 PROCEDURE — 1160F RVW MEDS BY RX/DR IN RCRD: CPT | Performed by: INTERNAL MEDICINE

## 2025-02-04 PROCEDURE — 1159F MED LIST DOCD IN RCRD: CPT | Performed by: INTERNAL MEDICINE

## 2025-02-04 PROCEDURE — 1036F TOBACCO NON-USER: CPT | Performed by: INTERNAL MEDICINE

## 2025-02-04 NOTE — LETTER
February 4, 2025     Virginia Perez MD  [address]  [facility] [address]    Patient: Terri Ley   YOB: 1955   Date of Visit: 2/4/2025       Dear Dr. Virginia Perez MD:    Thank you for referring Terri Ley to me for evaluation. Below are my notes for this consultation.  If you have questions, please do not hesitate to call me. I look forward to following your patient along with you.       Sincerely,     Sourav Hollis,       CC: No Recipients  ______________________________________________________________________________________        [facility] Failure [facility]  Primary Care Physician: Carli Cortes MD  Referring Provider/Cardiologist: Chris     Date of Visit: 02/04/2025  2:00 PM EST  Location of visit: [address]     HPI:   Ms. Ley is a 69F with a PMHx sig for aflutter/atach s/p RFA (12/2024) and GAB using CPAP who was referred to the Advanced Heart Failure clinic for consultation for an abnormal echo.     She was referred as recent cardiac imaging noted a dilated LA and mitral regurgitation. Her symptoms started last September when she developed tachycardia after a recent trip to [place]. She initially sought care at [facility], but was later seen by Dr. Perez in which she underwent a successful CTI RFA.     At the current time she denies chest pain, pressure, SOB/CENTENO, PND, orthopnea, LE edema, palpitations, light headedness, dizziness, or syncope. She reports no recurrences of arrhythmias with the assistance of her Applewatch. She has stopped using caffeine and tries to stay hydrated.       PMHx:  aflutter/atach s/p RFA (12/2024), GAB using CPAP    SocHx:  Lives in [place]  Former [facility], now helps facilitate adult travel services  Denies tobacco/etoh/illicits    FamHx:  Father and brother with CAD (very different lifestyles; father heavy smoker, brother is obese)      Current Outpatient Medications    Medication Sig Dispense Refill   • apixaban (Eliquis) 5 mg tablet Take 1 tablet (5 mg) by mouth 2 times a day. Resume in am 12/19/24     • cholecalciferol (Vitamin D3) 50 mcg (2,000 unit) capsule 1 capsule DAILY (route: oral)     • lifitegrast (Xiidra) 5 % dropperette Per instructions 2 TIMES DAILY (route: ophthalmic (eye))     • metoprolol succinate XL (Toprol-XL) 50 mg 24 hr tablet Take 1 tablet (50 mg) by mouth 2 times a day. 180 tablet 3   • multivitamin with minerals (multivitamin-iron-folic acid) tablet Take 1 tablet by mouth once daily.     • rosuvastatin (Crestor) 5 mg tablet Take 1 tablet (5 mg) by mouth 3 times a week.       No current facility-administered medications for this visit.       No Known Allergies      Visit Vitals  /85   Pulse 70   SpO2 95%   OB Status Postmenopausal   Smoking Status Never        Physical Exam:  On exam Ms. Ley appears her stated age, is alert and oriented x3, and in no acute distress. Her sclera are anicteric and her oropharynx has moist mucous membranes. Her neck is supple and without thyromegaly. The JVP is ~5 cm of water above the right atrium. Her cardiac exam has regular rhythm, normal S1, S2. No S3/4. There are no murmurs. Her lungs are clear to auscultation bilaterally and there is no dullness to percussion. Her abdomen is soft, nontender with normoactive bowel sounds. There is no HJR. The extremities are warm and without edema. The skin is dry. There is no rash present. The distal pulses are 2+ in all four extremities. Her mood and affect are appropriate for todays encounter.      Cardiac Labs/Diagnostics:    Lab Results   Component Value Date    CREATININE 0.63 01/16/2025    BUN 16 01/16/2025     01/16/2025    K 4.5 01/16/2025     01/16/2025    CO2 24 01/16/2025        ECG (1/16/25):  Sinus bradycardia (HR 58), occ PAC    KASSI (12/18/24):  1. Left ventricular ejection fraction is low normal, by visual estimate at 50-55%.  2. There is normal  right ventricular global systolic function.  3. The left atrium is moderate to severely dilated.  4. Moderate mitral valve regurgitation.  5. Moderate tricuspid regurgitation.  6. No left atrial mass.   7. No left atrial thrombus.    KASSI (9/30/24; [facility]):  - The left ventricle is normal in size. There is no left ventricular hypertrophy. Left ventricular systolic function is normal. EF = 55 ± 5% (visual est.)   - The right ventricle is normal in size. Right ventricular systolic function is normal.   - The left atrial cavity is dilated.   - The right atrial cavity is dilated.   - There are two jets of mitral regurgitation, one that originates medially and one that originates laterally. Overall 1+ MR.   - There is no left atrial or left atrial appendage thrombus.   - The patient has not had a prior [facility] echocardiographic exam for comparison.       Impression/Plan:  Ms. Ley is a 69F with a PMHx sig for aflutter/atach s/p RFA (12/2024) and GAB using CPAP who was referred to the Advanced Heart Failure clinic for consultation for an abnormal echo.     1) ? Cardiomyopathy  Referred after recent imaging noted bi-atrial dilatation with mild/mod MR/TR. Currently there is no evidence to suggest an infiltrative/restrictive CM as her ECGs are normal voltage and her echo has normal biventricular wall thickness. There is also no evidence of HCM. In addition, there is no evidence of structural/primary valve disease. Overall, suspect an atrial cardiopathy/atriopathy.   -will repeat an echo in 2 months (presuming she maintains sinus rhythm); pending the results, would then consider cMRI (if deemed necessary)      F/U: 2-3 months    Virginia,  Thank you for referring Ms. Ley to the  Advanced Heart Failure Clinic. Please let me know if you have any questions.     ____________________________________________________________  Sourav Hollis DO  Section of Advanced Heart Failure and Cardiac Transplantation  Division of Cardiovascular  Medicine  [facility] [place]  [facility]

## 2025-02-04 NOTE — PROGRESS NOTES
[facility] Advanced [facility] Failure [facility]  Primary Care Physician: Carli Cortes MD  Referring Provider/Cardiologist: Chris     Date of Visit: 02/04/2025  2:00 PM EST  Location of visit: [address]     HPI:   Ms. Ley is a 69F with a PMHx sig for aflutter/atach s/p RFA (12/2024) and GAB using CPAP who was referred to the Advanced Heart Failure clinic for consultation for an abnormal echo.     She was referred as recent cardiac imaging noted a dilated LA and mitral regurgitation. Her symptoms started last September when she developed tachycardia after a recent trip to [place]. She initially sought care at [facility], but was later seen by Dr. Perez in which she underwent a successful CTI RFA.     At the current time she denies chest pain, pressure, SOB/CENTENO, PND, orthopnea, LE edema, palpitations, light headedness, dizziness, or syncope. She reports no recurrences of arrhythmias with the assistance of her Applewatch. She has stopped using caffeine and tries to stay hydrated.       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 Resume in am 12/19/24      cholecalciferol (Vitamin D3) 50 mcg (2,000 unit) capsule 1 capsule DAILY (route: oral)      lifitegrast (Xiidra) 5 % dropperette Per instructions 2 TIMES DAILY (route: ophthalmic (eye))      metoprolol succinate XL (Toprol-XL) 50 mg 24 hr tablet Take 1 tablet (50 mg) by mouth 2 times a day. 180 tablet 3    multivitamin with minerals (multivitamin-iron-folic acid) tablet Take 1 tablet by mouth once daily.      rosuvastatin (Crestor) 5 mg tablet Take 1 tablet (5 mg)  by mouth 3 times a week.       No current facility-administered medications for this visit.       No Known Allergies      Visit Vitals  /85   Pulse 70   SpO2 95%   OB Status Postmenopausal   Smoking Status Never        Physical Exam:  On exam Ms. Ley appears her stated age, is alert and oriented x3, and in no acute distress. Her sclera are anicteric and her oropharynx has moist mucous membranes. Her neck is supple and without thyromegaly. The JVP is ~5 cm of water above the right atrium. Her cardiac exam has regular rhythm, normal S1, S2. No S3/4. There are no murmurs. Her lungs are clear to auscultation bilaterally and there is no dullness to percussion. Her abdomen is soft, nontender with normoactive bowel sounds. There is no HJR. The extremities are warm and without edema. The skin is dry. There is no rash present. The distal pulses are 2+ in all four extremities. Her mood and affect are appropriate for todays encounter.      Cardiac Labs/Diagnostics:    Lab Results   Component Value Date    CREATININE 0.63 01/16/2025    BUN 16 01/16/2025     01/16/2025    K 4.5 01/16/2025     01/16/2025    CO2 24 01/16/2025        ECG (1/16/25):  Sinus bradycardia (HR 58), occ PAC    KASSI (12/18/24):  1. Left ventricular ejection fraction is low normal, by visual estimate at 50-55%.  2. There is normal right ventricular global systolic function.  3. The left atrium is moderate to severely dilated.  4. Moderate mitral valve regurgitation.  5. Moderate tricuspid regurgitation.  6. No left atrial mass.   7. No left atrial thrombus.    KASSI (9/30/24; [facility]):  - The left ventricle is normal in size. There is no left ventricular hypertrophy. Left ventricular systolic function is normal. EF = 55 ± 5% (visual est.)   - The right ventricle is normal in size. Right ventricular systolic function is normal.   - The left atrial cavity is dilated.   - The right atrial cavity is dilated.   - There are two jets of mitral  regurgitation, one that originates medially and one that originates laterally. Overall 1+ MR.   - There is no left atrial or left atrial appendage thrombus.   - The patient has not had a prior [facility] echocardiographic exam for comparison.       Impression/Plan:  Ms. Ley is a 69F with a PMHx sig for aflutter/atach s/p RFA (12/2024) and GAB using CPAP who was referred to the Advanced Heart Failure clinic for consultation for an abnormal echo.     1) ? Cardiomyopathy  Referred after recent imaging noted bi-atrial dilatation with mild/mod MR/TR. Currently there is no evidence to suggest an infiltrative/restrictive CM as her ECGs are normal voltage and her echo has normal biventricular wall thickness. There is also no evidence of HCM. In addition, there is no evidence of structural/primary valve disease. Overall, suspect an atrial cardiopathy/atriopathy.   -will repeat an echo in 2 months (presuming she maintains sinus rhythm); pending the results, would then consider cMRI (if deemed necessary)      F/U: 2-3 months    Virginia,  Thank you for referring Ms. Ley to the  Advanced Heart Failure Clinic. Please let me know if you have any questions.     ____________________________________________________________  Sourav Hollis DO  Section of Advanced Heart Failure and Cardiac Transplantation  Division of Cardiovascular Medicine  [facility] [place]  [facility]

## 2025-02-04 NOTE — PATIENT INSTRUCTIONS
It was a pleasure seeing you today. Please contact myself or my team with any questions.     To reach Dr. Hollis' office please call [phone] ([place]).   Fax: [phone]   To schedule an appointment call [phone]     If you have any questions or need cardiac medication refills, please call the Heart Failure office at [phone], option [phone]. You may also contact the  Heart Failure Nursing team via email at [email or web address] (Please include your name and date of birth).        1) We will repeat an echocardiogram in 2 months  2) Follow up after at /[facility]

## 2025-02-07 PROCEDURE — 93272 ECG/REVIEW INTERPRET ONLY: CPT | Performed by: INTERNAL MEDICINE

## 2025-02-25 ENCOUNTER — APPOINTMENT (OUTPATIENT)
Dept: CARDIOLOGY | Facility: CLINIC | Age: 70
End: 2025-02-25
Payer: MEDICARE

## 2025-03-04 ENCOUNTER — APPOINTMENT (OUTPATIENT)
Dept: CARDIOLOGY | Facility: CLINIC | Age: 70
End: 2025-03-04
Payer: MEDICARE

## 2025-03-17 ENCOUNTER — APPOINTMENT (OUTPATIENT)
Dept: CARDIOLOGY | Facility: CLINIC | Age: 70
End: 2025-03-17
Payer: MEDICARE

## 2025-03-27 ENCOUNTER — APPOINTMENT (OUTPATIENT)
Dept: CARDIOLOGY | Facility: HOSPITAL | Age: 70
End: 2025-03-27
Payer: MEDICARE

## 2025-03-28 ENCOUNTER — APPOINTMENT (OUTPATIENT)
Dept: CARDIOLOGY | Facility: CLINIC | Age: 70
End: 2025-03-28
Payer: MEDICARE

## 2025-04-02 ENCOUNTER — OFFICE VISIT (OUTPATIENT)
Dept: PRIMARY CARE | Facility: CLINIC | Age: 70
End: 2025-04-02
Payer: MEDICARE

## 2025-04-02 ENCOUNTER — TELEPHONE (OUTPATIENT)
Dept: PRIMARY CARE | Facility: CLINIC | Age: 70
End: 2025-04-02
Payer: MEDICARE

## 2025-04-02 VITALS
BODY MASS INDEX: 24.09 KG/M2 | DIASTOLIC BLOOD PRESSURE: 78 MMHG | TEMPERATURE: 98.5 F | HEART RATE: 62 BPM | SYSTOLIC BLOOD PRESSURE: 112 MMHG | WEIGHT: 136 LBS | RESPIRATION RATE: 18 BRPM | OXYGEN SATURATION: 97 %

## 2025-04-02 DIAGNOSIS — J01.90 ACUTE BACTERIAL SINUSITIS: ICD-10-CM

## 2025-04-02 DIAGNOSIS — B96.89 ACUTE BACTERIAL SINUSITIS: ICD-10-CM

## 2025-04-02 DIAGNOSIS — H66.91 ACUTE RIGHT OTITIS MEDIA: Primary | ICD-10-CM

## 2025-04-02 PROCEDURE — 99213 OFFICE O/P EST LOW 20 MIN: CPT | Performed by: NURSE PRACTITIONER

## 2025-04-02 PROCEDURE — 1160F RVW MEDS BY RX/DR IN RCRD: CPT | Performed by: NURSE PRACTITIONER

## 2025-04-02 PROCEDURE — 1159F MED LIST DOCD IN RCRD: CPT | Performed by: NURSE PRACTITIONER

## 2025-04-02 PROCEDURE — 1036F TOBACCO NON-USER: CPT | Performed by: NURSE PRACTITIONER

## 2025-04-02 RX ORDER — AMOXICILLIN AND CLAVULANATE POTASSIUM 875; 125 MG/1; MG/1
875 TABLET, FILM COATED ORAL 2 TIMES DAILY
Qty: 20 TABLET | Refills: 0 | Status: SHIPPED | OUTPATIENT
Start: 2025-04-02 | End: 2025-04-12

## 2025-04-02 ASSESSMENT — ENCOUNTER SYMPTOMS
DIARRHEA: 0
FATIGUE: 0
VOMITING: 0
RHINORRHEA: 1
CHEST TIGHTNESS: 0
NAUSEA: 0
COUGH: 0
APPETITE CHANGE: 0
ABDOMINAL PAIN: 0
CHILLS: 0
MYALGIAS: 0
SINUS PRESSURE: 1
SORE THROAT: 1
FEVER: 0
WHEEZING: 0
SHORTNESS OF BREATH: 0
HEADACHES: 1

## 2025-04-02 NOTE — TELEPHONE ENCOUNTER
Returned call to the patient and advised her she can take coricidin hbp and to avoid medications with pseudoephedrine. Patient then asked what antibiotics she could take if she needs them. Will route to Dr. Perez to advise.

## 2025-04-02 NOTE — TELEPHONE ENCOUNTER
Received: Today  MD Guillermina Bustamante, RN  Caller: Unspecified (Today,  9:25 AM)  Defer to PCP  Amoxicillin would be ok      4/2/25 Spoke with pt. To advise.  She understood.  Karsten

## 2025-04-02 NOTE — TELEPHONE ENCOUNTER
Patient has a head cold, she is trying throat spray, tea etc but is   asking what OTC meds are safe for her to take,    She is also reaching out to her primary doctor, but since she has heart issues she wanted to ask your opinion.

## 2025-04-02 NOTE — PROGRESS NOTES
Subjective   Patient ID: Terri Ley is a 69 y.o. female who presents for Earache.    Symptoms started five or six days ago with left ear pressure. Pt has post nasal drainage and sinus pressure. Pt has yellow nasal drainage. No fevers. Pt tried saline spray and debrox drops. The saline spray helped slightly.          Review of Systems   Constitutional:  Negative for appetite change, chills, fatigue and fever.   HENT:  Positive for congestion, ear pain, postnasal drip, rhinorrhea, sinus pressure and sore throat.    Respiratory:  Negative for cough, chest tightness, shortness of breath and wheezing.    Cardiovascular:  Negative for chest pain.   Gastrointestinal:  Negative for abdominal pain, diarrhea, nausea and vomiting.   Musculoskeletal:  Negative for myalgias.   Neurological:  Positive for headaches.       Objective   /78   Pulse 62   Temp 36.9 °C (98.5 °F)   Resp 18   Wt 61.7 kg (136 lb)   SpO2 97%   BMI 24.09 kg/m²     Physical Exam  Vitals reviewed.   Constitutional:       General: She is not in acute distress.     Appearance: Normal appearance. She is not toxic-appearing.   HENT:      Head: Atraumatic.      Right Ear: Tympanic membrane is erythematous. Tympanic membrane is not bulging.      Left Ear: Tympanic membrane is not erythematous or bulging.      Nose: Congestion and rhinorrhea present.      Left Sinus: Maxillary sinus tenderness and frontal sinus tenderness present.      Mouth/Throat:      Mouth: Mucous membranes are moist.      Pharynx: Oropharynx is clear. No oropharyngeal exudate or posterior oropharyngeal erythema.      Comments: Post nasal drainage, uvula midline  Eyes:      Conjunctiva/sclera: Conjunctivae normal.   Cardiovascular:      Rate and Rhythm: Normal rate and regular rhythm.      Heart sounds: Normal heart sounds. No murmur heard.  Pulmonary:      Effort: Pulmonary effort is normal.      Breath sounds: Normal breath sounds. No wheezing or rhonchi.   Skin:      General: Skin is warm and dry.   Neurological:      General: No focal deficit present.      Mental Status: She is alert.   Psychiatric:         Mood and Affect: Mood normal.     Assessment/Plan   Problem List Items Addressed This Visit    None  Visit Diagnoses         Codes    Acute right otitis media    -  Primary H66.91    Relevant Medications    amoxicillin-pot clavulanate (Augmentin) 875-125 mg tablet    Acute bacterial sinusitis     J01.90, B96.89    Relevant Medications    amoxicillin-pot clavulanate (Augmentin) 875-125 mg tablet        Patient denied the need for covid/flu testing and states that she will test at home. Patient started on augmentin at this time. She will continue the nasal spray. Advised patient on use of humidifier and hot steam treatments. Discussed that patient is to drink plenty of fluids and stay well hydrated. Discussed that patient is to go to the ER for any chest pain, difficulty breathing, shortness of breath or new/concerning symptoms; she agreed. Pt to follow up in 2-3 days if no better despite the use of the medications.

## 2025-04-07 ENCOUNTER — HOSPITAL ENCOUNTER (OUTPATIENT)
Dept: CARDIOLOGY | Facility: HOSPITAL | Age: 70
Discharge: HOME | End: 2025-04-07
Payer: MEDICARE

## 2025-04-07 DIAGNOSIS — I48.4 ATYPICAL ATRIAL FLUTTER: ICD-10-CM

## 2025-04-07 LAB
AORTIC VALVE MEAN GRADIENT: 4 MMHG
AORTIC VALVE PEAK VELOCITY: 1.22 M/S
AV PEAK GRADIENT: 6 MMHG
AVA (PEAK VEL): 2.27 CM2
AVA (VTI): 1.98 CM2
EJECTION FRACTION APICAL 4 CHAMBER: 62.7
EJECTION FRACTION: 66 %
LEFT ATRIUM VOLUME AREA LENGTH INDEX BSA: 28.4 ML/M2
LEFT VENTRICLE INTERNAL DIMENSION DIASTOLE: 3.4 CM (ref 3.5–6)
LEFT VENTRICULAR OUTFLOW TRACT DIAMETER: 1.8 CM
LV EJECTION FRACTION BIPLANE: 66 %
MITRAL VALVE E/A RATIO: 1.11
RIGHT VENTRICLE FREE WALL PEAK S': 11.4 CM/S
RIGHT VENTRICLE PEAK SYSTOLIC PRESSURE: 28.6 MMHG
TRICUSPID ANNULAR PLANE SYSTOLIC EXCURSION: 2.1 CM

## 2025-04-07 PROCEDURE — 93306 TTE W/DOPPLER COMPLETE: CPT

## 2025-04-07 PROCEDURE — 93306 TTE W/DOPPLER COMPLETE: CPT | Performed by: INTERNAL MEDICINE

## 2025-04-14 ENCOUNTER — HOSPITAL ENCOUNTER (OUTPATIENT)
Dept: RADIOLOGY | Facility: HOSPITAL | Age: 70
Discharge: HOME | End: 2025-04-14
Payer: MEDICARE

## 2025-04-14 DIAGNOSIS — Z78.0 MENOPAUSE: ICD-10-CM

## 2025-04-14 PROCEDURE — 77080 DXA BONE DENSITY AXIAL: CPT

## 2025-04-14 PROCEDURE — 77080 DXA BONE DENSITY AXIAL: CPT | Performed by: STUDENT IN AN ORGANIZED HEALTH CARE EDUCATION/TRAINING PROGRAM

## 2025-04-21 ENCOUNTER — TELEPHONE (OUTPATIENT)
Dept: PRIMARY CARE | Facility: CLINIC | Age: 70
End: 2025-04-21
Payer: MEDICARE

## 2025-04-21 NOTE — TELEPHONE ENCOUNTER
Pt called- had bone density done on 4/14. Pt would like to review results and discuss plan of how to treat based on results. Please advise where to schedule- open appts are limited

## 2025-04-28 ENCOUNTER — APPOINTMENT (OUTPATIENT)
Dept: CARDIOLOGY | Facility: CLINIC | Age: 70
End: 2025-04-28
Payer: MEDICARE

## 2025-04-28 VITALS
SYSTOLIC BLOOD PRESSURE: 126 MMHG | WEIGHT: 137.8 LBS | HEIGHT: 63 IN | DIASTOLIC BLOOD PRESSURE: 64 MMHG | HEART RATE: 67 BPM | BODY MASS INDEX: 24.41 KG/M2

## 2025-04-28 DIAGNOSIS — Z78.9 NEVER SMOKED CIGARETTES: ICD-10-CM

## 2025-04-28 DIAGNOSIS — I48.91 ATRIAL FIBRILLATION, UNSPECIFIED TYPE (MULTI): ICD-10-CM

## 2025-04-28 DIAGNOSIS — Z71.89 ENCOUNTER TO DISCUSS TREATMENT OPTIONS: ICD-10-CM

## 2025-04-28 DIAGNOSIS — I47.19 ATRIAL TACHYCARDIA (CMS-HCC): ICD-10-CM

## 2025-04-28 DIAGNOSIS — Z71.89 ENCOUNTER FOR MEDICATION REVIEW AND COUNSELING: ICD-10-CM

## 2025-04-28 DIAGNOSIS — I48.0 PAROXYSMAL ATRIAL FIBRILLATION (MULTI): Primary | ICD-10-CM

## 2025-04-28 DIAGNOSIS — I48.92 ATRIAL FLUTTER, UNSPECIFIED TYPE (MULTI): ICD-10-CM

## 2025-04-28 PROCEDURE — 93000 ELECTROCARDIOGRAM COMPLETE: CPT | Performed by: INTERNAL MEDICINE

## 2025-04-28 PROCEDURE — 1036F TOBACCO NON-USER: CPT | Performed by: INTERNAL MEDICINE

## 2025-04-28 PROCEDURE — 99214 OFFICE O/P EST MOD 30 MIN: CPT | Performed by: INTERNAL MEDICINE

## 2025-04-28 PROCEDURE — 1159F MED LIST DOCD IN RCRD: CPT | Performed by: INTERNAL MEDICINE

## 2025-04-28 PROCEDURE — 3008F BODY MASS INDEX DOCD: CPT | Performed by: INTERNAL MEDICINE

## 2025-04-28 RX ORDER — METOPROLOL SUCCINATE 50 MG/1
50 TABLET, EXTENDED RELEASE ORAL DAILY
Qty: 90 TABLET | Refills: 3 | Status: SHIPPED | OUTPATIENT
Start: 2025-04-28

## 2025-04-28 ASSESSMENT — ENCOUNTER SYMPTOMS
DYSPNEA ON EXERTION: 0
PALPITATIONS: 0

## 2025-04-28 NOTE — PROGRESS NOTES
"  Patient ID: Terri Ley is a 69 y.o. female who presents for Hospital Follow-up (Cardioverted in January at [facility]).  History of Present Illness  The patient, with a history of atrial flutter s/p ablation and atypical atrial flutter / tachycardia s/p cardioversion, presents for a follow-up visit post cardioversion. She reports being in sinus rhythm since the procedure and has made lifestyle changes to maintain this, including eliminating caffeine and alcohol from her diet. She continues to exercise regularly, aiming for at least 10,000 steps daily.    She has been monitoring her blood pressure at home and noticed a decrease on the mornings after forgetting to take her evening dose of metoprolol. She is currently on metoprolol twice daily and Eliquis.    The patient also mentions a concern about potential sleep apnea, as she has been tracking her sleep with her Apple watch. She has not noticed any significant changes or issues.    PMHx:  See list    PSHx:  See list    Social Hx:  Social History[1]    Family Hx:  Family History[2]    Review of Systems   Cardiovascular:  Negative for chest pain, dyspnea on exertion and palpitations.   All other systems reviewed and are negative.        Objective     /64 (BP Location: Left arm, Patient Position: Sitting)   Pulse 67   Ht 1.6 m (5' 3\")   Wt 62.5 kg (137 lb 12.8 oz)   BMI 24.41 kg/m²        LABS:  CBC:   Lab Results   Component Value Date    WBC 6.0 12/12/2024    RBC 5.19 12/12/2024    HGB 15.2 12/12/2024    HCT 48.6 (H) 12/12/2024    MCV 94 12/12/2024    MCH 29.3 12/12/2024    MCHC 31.3 (L) 12/12/2024    RDW 13.0 12/12/2024     12/12/2024     CBC with Differential:    Lab Results   Component Value Date    WBC 6.0 12/12/2024    RBC 5.19 12/12/2024    HGB 15.2 12/12/2024    HCT 48.6 (H) 12/12/2024     12/12/2024    MCV 94 12/12/2024    MCH 29.3 12/12/2024    MCHC 31.3 (L) 12/12/2024    RDW 13.0 12/12/2024    NRBC 0.0 12/12/2024    LYMPHOPCT " "16.2 12/16/2022    MONOPCT 10.9 12/16/2022    EOSPCT 1.6 12/16/2022    BASOPCT 0.4 12/16/2022    MONOSABS 0.74 12/16/2022    LYMPHSABS 1.10 (L) 12/16/2022    EOSABS 0.11 12/16/2022    BASOSABS 0.03 12/16/2022     CMP:    Lab Results   Component Value Date     01/16/2025    K 4.5 01/16/2025     01/16/2025    CO2 24 01/16/2025    BUN 16 01/16/2025    CREATININE 0.63 01/16/2025    GLUCOSE 99 01/16/2025    PROT 6.8 12/05/2022    CALCIUM 9.4 01/16/2025    BILITOT 0.8 12/05/2022    ALKPHOS 69 12/05/2022    AST 23 12/05/2022    ALT 33 12/05/2022     BMP:    Lab Results   Component Value Date     01/16/2025    K 4.5 01/16/2025     01/16/2025    CO2 24 01/16/2025    BUN 16 01/16/2025    CREATININE 0.63 01/16/2025    CALCIUM 9.4 01/16/2025    GLUCOSE 99 01/16/2025     Magnesium:No results found for: \"MG\"        Physical Exam  Constitutional:       General: Awake.      Appearance: Normal and healthy appearance. Well-developed and not in distress.   Neck:      Vascular: No JVR. JVD normal.   Pulmonary:      Effort: Pulmonary effort is normal.      Breath sounds: Normal breath sounds. No wheezing. No rhonchi. No rales.   Chest:      Chest wall: Not tender to palpatation.   Cardiovascular:      PMI at left midclavicular line. Normal rate. Regular rhythm. Normal S1. Normal S2.       Murmurs: There is no murmur.      No gallop.  No click. No rub.   Pulses:     Intact distal pulses.   Edema:     Peripheral edema absent.   Abdominal:      Tenderness: There is no abdominal tenderness.   Musculoskeletal: Normal range of motion.         General: No tenderness. Skin:     General: Skin is warm and dry.   Neurological:      General: No focal deficit present.      Mental Status: Alert and oriented to person, place and time.           ECG. See scanned.    Assessment & Plan  Atrial Tachycardia / atypical atrial flutter  Atrial tachycardia controlled post-cardioversion, sinus rhythm maintained, asymptomatic. Lifestyle " changes may aid rhythm maintenance. On metoprolol and Eliquis.  - Perform ECG to assess rhythm.  - If ECG normal, discontinue Eliquis, reduce metoprolol to once daily in the morning.  - If symptoms recur, restart Eliquis, notify office for 30-day monitor.  - Monitor trends with watch, report significant changes.    Atrial Flutter  Atypical atrial flutter   S/p ablation 2024  - Perform ECG to confirm rhythm.  - If ECG normal, discontinue Eliquis.  - Monitor for symptom recurrence with iwatch.  - If symptoms recur, restart Eliquis, notify office for 30-day monitor.    Counseled greater than 50% of the visit.  The patient and I discussed arrhythmia, ECG, shared decision making, previous ablation, cardioversion, treatment options, risk, benefits, and imponderables.  Lifestyle modifications reviewed.  All questions answered.  Patient appreciative of care  Assessment & Plan  Paroxysmal atrial fibrillation (Multi)    Atrial flutter, unspecified type (Multi)    Atrial tachycardia ([facility])    BMI 24.0-24.9, adult    Encounter for medication review and counseling    Encounter to discuss treatment options    Never smoked cigarettes    Atrial fibrillation, unspecified type (Multi)      Guillermina Boateng RN     This medical note was created with the assistance of artificial intelligence (AI) for documentation purposes. The content has been reviewed and confirmed by the healthcare provider for accuracy and completeness. Patient consented to the use of audio recording and use of AI during their visit.     I, , personally performed the services described in the documentation as scribed by the nurse in my presence, and confirm it is both accurate and complete.     Total, 37 inclusive of review of ablation, cardioversion, and previous cardiology notes         [1]   Social History  Socioeconomic History    Marital status:    Tobacco Use    Smoking status: Never    Smokeless tobacco: Never   Substance and Sexual Activity     Alcohol use: Not Currently     Comment: social    Drug use: Not Currently    Sexual activity: Defer     Social Drivers of Health     Financial Resource Strain: Low Risk  (12/11/2024)    Received from [facility]    Overall Financial Resource Strain (CARDIA)     Difficulty of Paying Living Expenses: Not hard at all   Food Insecurity: No Food Insecurity (12/11/2024)    Received from [facility]    Hunger Vital Sign     Worried About Running Out of Food in the Last Year: Never true     Ran Out of Food in the Last Year: Never true   Transportation Needs: No Transportation Needs (12/11/2024)    Received from [facility]    PRAPARE - Transportation     Lack of Transportation (Medical): No     Lack of Transportation (Non-Medical): No   Physical Activity: Sufficiently Active (12/11/2024)    Received from [facility]    Exercise Vital Sign     Days of Exercise per Week: 7 days     Minutes of Exercise per Session: 60 min   Stress: No Stress Concern Present (12/11/2024)    Received from [facility]    [employer] [place] [employer] - Occupational Stress Questionnaire     Feeling of Stress : Not at all   Social Connections: Socially Integrated (12/11/2024)    Received from [facility]    Social Connection and Isolation Panel [NHANES]     Frequency of Communication with Friends and Family: More than three times a week     Frequency of Social Gatherings with Friends and Family: More than three times a week     Attends Sabianism Services: More than 4 times per year     Active Member of Clubs or Organizations: Yes     Attends Club or Organization Meetings: More than 4 times per year     Marital Status:    Housing Stability: Low Risk  (5/12/2020)    Received from [facility]    Housing Stability Vital Sign     Unable to Pay for Housing in the Last Year: No     Number of Places Lived in the Last Year: 1     Unstable Housing in the Last Year: No   [2]   Family History  Problem  Relation Name Age of Onset    Breast cancer Mother      Hip fracture Mother           from MRSA after infection    Heart disease Father      Other (smoker) Father      Coronary artery disease Father      Other (CABG) Father      Heart disease Brother      Heart disease Maternal Grandfather      Breast cancer Paternal Grandmother      Heart disease Paternal Grandfather

## 2025-04-28 NOTE — PATIENT INSTRUCTIONS
Continue same medications/treatment.  Patient educated on proper medication use.  Patient educated on risk factor modification.  Please bring any lab results from other providers/physicians to your next appointment.    Please bring all medicines, vitamins, and herbal supplements with you when you come to the office.    Prescriptions will not be filled unless you are compliant with your follow up appointments or have a follow up appointment scheduled as per instruction of your physician. Refills should be requested at the time of your visit.    STOP Eliquis  DECREASE metoprolol succinate to 50mg once daily  Follow up with Dr. Perez in 1 year    ISTEF RN, AM SCRIBING FOR AND IN THE PRESENCE OF DR. FRIEDA PEREZ MD, FACC, FACP, RS

## 2025-05-05 ENCOUNTER — APPOINTMENT (OUTPATIENT)
Dept: CARDIOLOGY | Facility: CLINIC | Age: 70
End: 2025-05-05
Payer: MEDICARE

## 2025-05-05 VITALS
DIASTOLIC BLOOD PRESSURE: 78 MMHG | HEART RATE: 59 BPM | SYSTOLIC BLOOD PRESSURE: 158 MMHG | OXYGEN SATURATION: 100 % | WEIGHT: 138 LBS | HEIGHT: 63 IN | BODY MASS INDEX: 24.45 KG/M2

## 2025-05-05 DIAGNOSIS — R93.1 ABNORMAL ECHOCARDIOGRAM: Primary | ICD-10-CM

## 2025-05-05 DIAGNOSIS — I34.0 NONRHEUMATIC MITRAL VALVE REGURGITATION: ICD-10-CM

## 2025-05-05 DIAGNOSIS — I48.0 PAROXYSMAL ATRIAL FIBRILLATION (MULTI): ICD-10-CM

## 2025-05-05 PROCEDURE — 1159F MED LIST DOCD IN RCRD: CPT | Performed by: INTERNAL MEDICINE

## 2025-05-05 PROCEDURE — 1036F TOBACCO NON-USER: CPT | Performed by: INTERNAL MEDICINE

## 2025-05-05 PROCEDURE — 99213 OFFICE O/P EST LOW 20 MIN: CPT | Performed by: INTERNAL MEDICINE

## 2025-05-05 PROCEDURE — 1160F RVW MEDS BY RX/DR IN RCRD: CPT | Performed by: INTERNAL MEDICINE

## 2025-05-05 PROCEDURE — 3008F BODY MASS INDEX DOCD: CPT | Performed by: INTERNAL MEDICINE

## 2025-05-05 NOTE — PROGRESS NOTES
"    [facility]  Primary Care Physician: Carli Cortes MD  Referring Provider/Cardiologist: Chris     Date of Visit: 05/05/2025  2:20 PM EDT  Location of visit: [address]     HPI:   Ms. Ley is a 69F with a PMHx sig for aflutter/atach s/p RFA (12/2024) and GAB using CPAP who returns to the Advanced Heart Failure clinic for ongoing evaluation and management of an abnormal echo.      Interval hx:   Repeat echo with improved LA size and reduction in MRCoby     At the current time she denies chest pain, pressure, SOB/CENTENO, PND, orthopnea, LE edema, palpitations, light headedness, dizziness, or syncope.       PMHx:  aflutter/atach s/p RFA (12/2024), GAB using CPAP    SocHx:  Lives in [place]  Former [employer] teacher, now helps facilitate adult travel services  Denies tobacco/etoh/illicits    FamHx:  Father and brother with CAD (very different lifestyles; father heavy smoker, brother is obese)      Current Outpatient Medications   Medication Sig Dispense Refill    cholecalciferol (Vitamin D3) 50 mcg (2,000 unit) capsule 1 capsule DAILY (route: oral)      lifitegrast (Xiidra) 5 % dropperette Per instructions 2 TIMES DAILY (route: ophthalmic (eye))      metoprolol succinate XL (Toprol-XL) 50 mg 24 hr tablet Take 1 tablet (50 mg) by mouth once daily. 90 tablet 3    multivitamin with minerals (multivitamin-iron-folic acid) tablet Take 1 tablet by mouth once daily.      rosuvastatin (Crestor) 5 mg tablet Take 1 tablet (5 mg) by mouth 3 times a week.       No current facility-administered medications for this visit.       No Known Allergies      Visit Vitals  /78 (BP Location: Right arm, Patient Position: Sitting)   Pulse 59   Ht 1.6 m (5' 3\")   Wt 62.6 kg (138 lb)   SpO2 100%   BMI 24.45 kg/m²   OB Status Postmenopausal   Smoking Status Never   BSA 1.67 m²        Physical Exam:  On exam Ms. Ley appears her stated age, is alert and oriented x3, and in no acute " distress. Her sclera are anicteric and her oropharynx has moist mucous membranes. Her neck is supple and without thyromegaly. The JVP is ~5 cm of water above the right atrium. Her cardiac exam has regular rhythm, normal S1, S2. No S3/4. There are no murmurs. Her lungs are clear to auscultation bilaterally and there is no dullness to percussion. Her abdomen is soft, nontender with normoactive bowel sounds. There is no HJR. The extremities are warm and without edema. The skin is dry. There is no rash present. The distal pulses are 2+ in all four extremities. Her mood and affect are appropriate for todays encounter.      Cardiac Labs/Diagnostics:    Lab Results   Component Value Date    CREATININE 0.63 01/16/2025    BUN 16 01/16/2025     01/16/2025    K 4.5 01/16/2025     01/16/2025    CO2 24 01/16/2025        Echo (4/7/25):  1. The left ventricular systolic function is normal, with a Whitman's biplane calculated ejection fraction of 66%.  2. The estimated RVSP is 29 mm.    ECG (1/16/25):  Sinus bradycardia (HR 58), occ PAC    KASSI (12/18/24):  1. Left ventricular ejection fraction is low normal, by visual estimate at 50-55%.  2. There is normal right ventricular global systolic function.  3. The left atrium is moderate to severely dilated.  4. Moderate mitral valve regurgitation.  5. Moderate tricuspid regurgitation.  6. No left atrial mass.   7. No left atrial thrombus.    KASSI (9/30/24; [facility]):  - The left ventricle is normal in size. There is no left ventricular hypertrophy. Left ventricular systolic function is normal. EF = 55 ± 5% (visual est.)   - The right ventricle is normal in size. Right ventricular systolic function is normal.   - The left atrial cavity is dilated.   - The right atrial cavity is dilated.   - There are two jets of mitral regurgitation, one that originates medially and one that originates laterally. Overall 1+ MR.   - There is no left atrial or left atrial appendage thrombus.   - The  patient has not had a prior CC echocardiographic exam for comparison.       Impression/Plan:  Ms. Ley is a 69F with a PMHx sig for aflutter/atach s/p RFA (12/2024) and GAB using CPAP who returns to the Advanced Heart Failure clinic for ongoing evaluation and management of an abnormal echo.     1) ? Cardiomyopathy  Referred after imaging noted bi-atrial dilatation with mild/mod MR/TR. Currently there is no evidence to suggest an infiltrative/restrictive CM as her ECGs are normal voltage and her echo has normal biventricular wall thickness. There is also no evidence of HCM. In addition, there is no evidence of structural/primary valve disease. A repeat echo has shown improvement in her MR and LA size. No further testing required at the current time.       F/U: as needed    [place],  Thank you for referring Ms. Ley to the  Advanced Heart Failure Clinic. Please let me know if you have any questions.     ____________________________________________________________  Sourav Hollis DO  Section of Advanced Heart Failure and Cardiac Transplantation  Division of Cardiovascular Medicine  [facility]  [facility]

## 2025-05-07 ENCOUNTER — TELEPHONE (OUTPATIENT)
Dept: PRIMARY CARE | Facility: CLINIC | Age: 70
End: 2025-05-07
Payer: MEDICARE

## 2025-05-07 DIAGNOSIS — Z71.84 TRAVEL ADVICE ENCOUNTER: Primary | ICD-10-CM

## 2025-05-07 NOTE — TELEPHONE ENCOUNTER
Pt called going on [facility] and was asking for rx for travelers diarrhea and preventive milaria medicine.  Pt asked does she need to be seen by Dr. Cortes to get these rx's?  Pt leaving in July    Pharmacy=  [facility] in [place] on [address]    Please call pt at  [phone]

## 2025-05-08 NOTE — TELEPHONE ENCOUNTER
Called patient no answer, LVM informing patient that this does not require a visit with Dr. Cortes but she does recommend that she go to  travel clinic or health department. Did mention that Dr. Cortes did place referral if she decides to go to  travel clinic instead of health department.  scheduling number left on patients voicemail as well. Advised patient to give office a call back if any further questions.

## 2025-05-16 DIAGNOSIS — Z29.89 NEED FOR MALARIA PROPHYLAXIS: Primary | ICD-10-CM

## 2025-05-16 DIAGNOSIS — R69 TRAVEL-RELATED ILLNESS: ICD-10-CM

## 2025-05-16 RX ORDER — AZITHROMYCIN 500 MG/1
500 TABLET, FILM COATED ORAL DAILY
Qty: 3 TABLET | Refills: 0 | Status: SHIPPED | OUTPATIENT
Start: 2025-05-16 | End: 2025-05-19

## 2025-05-16 RX ORDER — ATOVAQUONE AND PROGUANIL HYDROCHLORIDE 250; 100 MG/1; MG/1
1 TABLET, FILM COATED ORAL DAILY
Qty: 20 TABLET | Refills: 0 | Status: SHIPPED | OUTPATIENT
Start: 2025-05-16 | End: 2025-06-05

## 2025-05-23 DIAGNOSIS — Z29.89 NEED FOR MALARIA PROPHYLAXIS: ICD-10-CM

## 2025-05-28 RX ORDER — ATOVAQUONE AND PROGUANIL HYDROCHLORIDE 250; 100 MG/1; MG/1
1 TABLET, FILM COATED ORAL DAILY
Qty: 20 TABLET | Refills: 0 | OUTPATIENT
Start: 2025-05-28 | End: 2025-06-17

## 2025-10-17 ENCOUNTER — APPOINTMENT (OUTPATIENT)
Dept: PRIMARY CARE | Facility: CLINIC | Age: 70
End: 2025-10-17
Payer: MEDICARE

## 2026-05-04 ENCOUNTER — APPOINTMENT (OUTPATIENT)
Dept: CARDIOLOGY | Facility: CLINIC | Age: 71
End: 2026-05-04
Payer: MEDICARE

## (undated) DEVICE — INTRODUCER, SHEATH, FAST-CATH, 8FR X 12CM, C-LOCK

## (undated) DEVICE — CATHETER, ABLATION, SAFIRE, 7FR/4MM, LRG CURL, BI-DIRECTIONAL

## (undated) DEVICE — SHIELD, PERIPHERAL, RADPAD, 11 X 34IN, W/ ABSORBENT, ORANGE, STERILE

## (undated) DEVICE — BANDAGE, QUIKCLOT, INTERVENTIONAL HEMO, W/O SLIT

## (undated) DEVICE — CATHETER, EP QUADRIPOLAR, IBI, 6FR X 110CM, STEERABLE, 2-5-2MM, LARGE CURVE

## (undated) DEVICE — INTRODUCER, SHEATH, FAST-CATH, 7FR X 12CM, C-LOCK

## (undated) DEVICE — CATHETER, INQUIRY, 6FR X 110CM, 2-5-2MM SPACING, 1MM TIP, LG CURVE STEERABLE

## (undated) DEVICE — CLOSURE SYSTEM, VASCULAR, MVP 6-12FR, VENOUS

## (undated) DEVICE — ELECTRODE KIT, ENSITE X EP SYSTEM SURFACE